# Patient Record
Sex: FEMALE | Race: WHITE | NOT HISPANIC OR LATINO | Employment: UNEMPLOYED | URBAN - METROPOLITAN AREA
[De-identification: names, ages, dates, MRNs, and addresses within clinical notes are randomized per-mention and may not be internally consistent; named-entity substitution may affect disease eponyms.]

---

## 2019-03-06 ENCOUNTER — OFFICE VISIT (OUTPATIENT)
Dept: OBGYN CLINIC | Facility: CLINIC | Age: 34
End: 2019-03-06

## 2019-03-06 VITALS
DIASTOLIC BLOOD PRESSURE: 75 MMHG | BODY MASS INDEX: 26.06 KG/M2 | HEIGHT: 61 IN | SYSTOLIC BLOOD PRESSURE: 119 MMHG | HEART RATE: 69 BPM | WEIGHT: 138 LBS

## 2019-03-06 DIAGNOSIS — Z01.419 WOMEN'S ANNUAL ROUTINE GYNECOLOGICAL EXAMINATION: Primary | ICD-10-CM

## 2019-03-06 PROCEDURE — 99385 PREV VISIT NEW AGE 18-39: CPT | Performed by: NURSE PRACTITIONER

## 2019-03-06 PROCEDURE — G0124 SCREEN C/V THIN LAYER BY MD: HCPCS | Performed by: PATHOLOGY

## 2019-03-06 PROCEDURE — G0145 SCR C/V CYTO,THINLAYER,RESCR: HCPCS | Performed by: PATHOLOGY

## 2019-03-06 PROCEDURE — 87624 HPV HI-RISK TYP POOLED RSLT: CPT | Performed by: NURSE PRACTITIONER

## 2019-03-06 RX ORDER — MULTIVITAMIN
1 CAPSULE ORAL DAILY
COMMUNITY
End: 2021-10-14 | Stop reason: ALTCHOICE

## 2019-03-06 NOTE — PATIENT INSTRUCTIONS
Autoexamen del seno para las mujeres   LO QUE NECESITA SABER:   ¿Qué es el autoexamen de seno? Un autoexamen de seno es [place] de revisar si lorena senos tienen protuberancias u otros cambios  Los autoexámenes de seno regulares pueden ayudarla a conocer cómo se ananya y se sienten lorena senos normalmente  La mayoría de las protuberancias o cambios en el seno  no son cáncer, jolene usted siempre debería ir con urrutia médico para que la revise  Urrutia médico también puede observarla e indicarle si usted está realizando urrutia autoexamen correctamente  ¿Por qué debería realizarme un autoexamen de seno? El cáncer de seno es el tipo de cáncer más común en las mujeres  Aún si a usted le [employer], todavía puede seguir revisándose regularmente  Si usted sabe cómo se sienten y se ananya lorena senos normalmente, eso podría ayudarle a determinar cuándo comunicarse con urrutia médico  Es posible que lita mamografía no detecte algún cáncer  Usted podría encontrar lita protuberancia lucrecia un autoexamen de seno que no se detectó con urrutia mamografía  ¿Cuándo debería realizarme un autoexamen de seno? Asif urrutia calendario para que recuerde hacerse un autoexamen del seno en lita fecha regular  Lita forma fácil de recordar es realizar el autoexamen de seno en el mismo día cada mes  Si usted tiene [place], es posible que lo mejor sea que se franky un autoexamen 1 semana después de que terminó urrutia periodo  [place] es cuando lorena senos podrían estar menos inflamados, abultados o sensibles  Usted puede realizarse autoexámenes del seno regulares incluso si está dando de lactar o si tiene implantes en el seno  ¿Cómo debería realizarme un autoexamen de seno? · Observe lorena senos en un naty  Observe el tamaño y la forma de cada seno y del pezón  Revise si hay inflamación, protuberancias, hoyuelos, piel descamada u otros cambios en la piel  Vigile los cambios en el pezón, ceci cuando tiene dolor o que comienza a hundirse   Apriete cuidadosamente ambos pezones y revise si sale líquido (que no sea [place]) de ellos  Si usted detecta cualquiera de estos u otros cambios en lorena senos, comuníquese con urrutia médico  Revise lorena senos mientras está sentada o olvera en las 3 siguientes posiciones:    ¨ Cuelgue lorena brazos en lorena costados  ¨ Levante lorena josemanuel y [place] detrás de urrutia johanyn  ¨ Ejerza presión firme con lorena josemanuel sobre lorena caderas  Inclínese un poco hacia adelante mientras observa lorena senos en el naty  · Acuéstese y palpe lorena senos  Cuando usted se [place], el tejido de lorena senos se extiende uniformemente sobre urrutia pecho  [place] facilita que usted sienta protuberancias o cualquier cosa que podría no ser normal para lorena senos  Realice un autoexamen con un seno a la vez  ¨ Coloque lita almohada pequeña o lita toalla debajo de urrutia hombro bhavesh  Coloque urrutia brazo bhavesh detrás de urrutia johanny  ¨ Use los 3 dedos medios de urrutia mano derecha  Use las yemas de los dedos, en la parte superior  La yema del dedo es la parte más sensible de urrutia dedo  ¨ Mike círculos pequeños para sentir el tejido del seno  Use las yemas de lorena dedos para hacer círculos pequeños empalmados sobre lorena senos y [place]  Use presión ligera, media y firme  Ezio, presione ligeramente  Después, presione con lita presión media para sentir un poco más profundo en urrutia seno  Por último, use presión firme para sentir urrutia seno en lo más profundo  ¨ Examine el área completa de urrutia seno  Examine el área del seno desde arriba hasta abajo donde usted siente las [place]  Kip Rana pequeños con las yemas de los dedos, comenzando en la parte media de urrutia axila  Kip Rana subiendo y [place] el área del seno  Continúe hacia urrutia seno, hasta llegar al [employer]  Examine toda el área desde la axila hasta el centro de urrutia pecho ([place])  Deténgase en el centro de urrutia pecho      ¨ Mueva la almohada o toalla hacia urrutia hombro derecho y coloque urrutia Theodore Mancia derecho detrás de valerio johanny  Use las yemas de los 3 dedos medios de valerio mano izquierda y repita los pasos anteriores para realizar un autoexamen en valerio seno derecho  ¿Qué más puedo hacer para la revisión de problemas de seno o del cáncer de seno? Algunos expertos sugieren que las mujeres de 36 años de edad y mayores deberían realizarse lita mamografía cada año  Otros sugieren [employer] 49 y 77 años de edad se smitha lita mamografía cada 2 años  Consulte con valerio médico sobre cuándo usted debería [employer]  ¿Cuándo manju comunicarme con mi médico?   · Usted encuentra algún tipo de bulto o cambio en lorena senos  · Usted tiene [facility], o le sale líquido de lorena pezones  · Usted tiene preguntas o inquietudes acerca de valerio condición o cuidado  ACUERDOS SOBRE VALERIO CUIDADO:   Usted tiene el derecho de ayudar a planear valerio cuidado  Aprenda todo lo que pueda sobre valerio condición y ceci darle tratamiento  Discuta lorena opciones de tratamiento con lorena médicos para decidir el cuidado que usted desea recibir  Usted siempre tiene el derecho de rechazar el tratamiento  Esta información es sólo para uso en educación  Valerio intención no es darle un consejo médico sobre enfermedades o tratamientos  Colsulte con valerio Rosemarie Meléndez farmacéutico antes de seguir cualquier régimen médico para saber si es seguro y efectivo para usted  © 2017 [address] Information is for End User's use only and may not be sold, redistributed or otherwise used for commercial purposes  All illustrations and images included in CareNotes® are the copyrighted property of A D A M , Inc  or Davion Milian de Papanicolaou   LO QUE NECESITA SABER:   ¿Qué necesito saber acerca del Papanicolaou? Un frotis o de Papanicolaou o lita citología se [place] en la detección del cáncer cervical  También se [place] para detectar células cancerosas y precancerosas en la vulva y en la vagina     ¿Cómo me preparo para la prueba del Papanicolaou? El mejor momento para programar el examen es arlin después de que termine urrutia menstruación  No programe lita citología lucrecia urrutia periodo menstrual   ¿Qué sucederá lucrecia la prueba del Papanicolaou? · Usted se acostará boca arriba y colocará lorena pies en unos reposapiés que se conocen ceci estribos  Urrutia médico introducirá cuidadosamente en urrutia vagina un dispositivo que se conoce ceci espéculo  El espéculo se [place] para abrir las nicole de urrutia vagina para que el médico pueda observar urrutia modesto uterino  · Urrutia médico raspará cuidadosamente el modesto uterino y [place] vaginal para yaneth muestras de células  Las muestras se colocan en un tubo con líquido o en un portaobjetos de nain  Las muestras se mandan al laboratorio para ser analizadas por la presencia de células anormales  Podrían realizarle un examen del virus del papiloma humano (VPH) al [employer]  El VPH es un virus de transmisión sexual que puede provocar cambios en lorena células cervicales  ¿Qué sucederá después de un Papanicolaou? Urrutia médico le indicará para cuándo puede esperar lorena resultados de la prueba del Papanicolaou  Es posible que presente manchado el día del procedimiento  ¿Con qué frecuencia necesito un Papanicolaou? El examen del Papanicolaou por lo general se realiza entre 3 a 5 años dependiendo de urrutia edad  Usted podría necesitar un Papanicolaou con mayor frecuencia si presenta cualquiera de los siguientes:  · Un examen con resultado positivo para el virus del papiloma humano (VPH)    · Antecedentes de cáncer cervical    · El VIH    · Un sistema inmunitario débil    · Exposición al [place] de dietilestilbestrol (AMPARO) cuando urrutia madre estaba embarazada de usted  ACUERDOS SOBRE URRUTIA CUIDADO:   Usted tiene el derecho de ayudar a planear urrutia cuidado  Aprenda todo lo que pueda sobre urrutia condición y ceci darle tratamiento  Discuta lorena opciones de tratamiento con lorena médicos para decidir el cuidado que usted desea recibir  Usted siempre tiene el derecho de rechazar el tratamiento  Esta información es sólo para uso en educación  Urrutia intención no es darle un consejo médico sobre enfermedades o tratamientos  Colsulte con urrutia Alexandra Filiberto farmacéutico antes de seguir cualquier régimen médico para saber si es seguro y efectivo para usted  © 2017 [address] Information is for End User's use only and may not be sold, redistributed or otherwise used for commercial purposes  All illustrations and images included in CareNotes® are the copyrighted property of A D A M , Inc  or Davion Terrell

## 2019-03-06 NOTE — PROGRESS NOTES
Patient is [place] speaking- rose mary Mccarty at bedside to translate  Shobha Sky is a 29 y o  female who presents for annual well woman exam   Last Pap smear approximately 15 years ago  Will collect Pap smear today  Denies history of abnormal Pap smears  Periods are 30, lasting 3 days  No intermenstrual bleeding, spotting, or discharge  Current contraception: Britany 28 - from [place]   History of abnormal Pap smear: no  Family history of uterine or ovarian cancer: no  Regular self breast exam: no  History of abnormal mammogram:  Mammograms to be a age 37  Family history of breast cancer: no  History of abnormal lipids: no    Menstrual History:  OB History        2    Para   2    Term   0       0    AB   0    Living   2       SAB   0    TAB   0    Ectopic   0    Multiple   0    Live Births   2                Menarche age: 14  No LMP recorded  Period Cycle (Days): 30  Period Duration (Days): 3 days  Period Pattern: Regular  Menstrual Flow: Moderate  Menstrual Control: Maxi pad, Thin pad    The following portions of the patient's history were reviewed and updated as appropriate: allergies, current medications, past family history, past medical history, past social history, past surgical history and problem list     Review of Systems  Pertinent items are noted in HPI        Objective      /66 (BP Location: Left arm, Patient Position: Sitting, Cuff Size: Standard)   Pulse 75   Ht 5' 1" (1 549 m)   Wt 62 6 kg (138 lb)   BMI 26 07 kg/m²     General:   alert and oriented, in no acute distress, alert, cooperative, appears stated age and cooperative   Heart: regular rate and rhythm, S1, S2 normal, no murmur, click, rub or gallop   Lungs: clear to auscultation bilaterally   Abdomen: soft, non-tender, without masses or organomegaly, nondistended and normal bowel sounds   Vulva: normal, Bartholin's, Urethra, [place]'s normal, female escutcheon   Vagina: normal mucosa, normal discharge, no palpable nodules   Cervix: no bleeding following Pap, no cervical motion tenderness and no lesions   Uterus: normal size, non-tender, normal shape and consistency   Adnexa: normal adnexa and no mass, fullness, tenderness   Breast:  Nontender, no palpable masses, no nipple discharge, no skin changes bilaterally  Assessment      @well woman@   Plan      All questions answered  Await pap smear results  Breast self exam technique reviewed and patient encouraged to perform self-exam monthly  Contraception: Birth control pill from [place]  Diagnosis explained in detail, including differential   Dietary diary  Discussed healthy lifestyle modifications  Educational material distributed  Pap smear  Thin prep Pap smear    Breast awareness reviewed    Will call with abnormal results    RTO 1 year or sooner as needed

## 2019-03-07 LAB
HPV HR 12 DNA CVX QL NAA+PROBE: NEGATIVE
HPV16 DNA CVX QL NAA+PROBE: NEGATIVE
HPV18 DNA CVX QL NAA+PROBE: POSITIVE

## 2019-03-12 LAB
LAB AP GYN PRIMARY INTERPRETATION: ABNORMAL
Lab: ABNORMAL
PATH INTERP SPEC-IMP: ABNORMAL

## 2019-03-13 ENCOUNTER — PATIENT OUTREACH (OUTPATIENT)
Dept: OBGYN CLINIC | Facility: CLINIC | Age: 34
End: 2019-03-13

## 2019-03-13 ENCOUNTER — TELEPHONE (OUTPATIENT)
Dept: OBGYN CLINIC | Facility: CLINIC | Age: 34
End: 2019-03-13

## 2019-03-22 ENCOUNTER — PROCEDURE VISIT (OUTPATIENT)
Dept: OBGYN CLINIC | Facility: CLINIC | Age: 34
End: 2019-03-22

## 2019-03-22 VITALS
BODY MASS INDEX: 26.41 KG/M2 | HEIGHT: 61 IN | HEART RATE: 76 BPM | WEIGHT: 139.9 LBS | DIASTOLIC BLOOD PRESSURE: 76 MMHG | SYSTOLIC BLOOD PRESSURE: 118 MMHG

## 2019-03-22 DIAGNOSIS — B97.7 HPV IN FEMALE: ICD-10-CM

## 2019-03-22 DIAGNOSIS — R87.612 PAP SMEAR ABNORMALITY OF CERVIX WITH LGSIL: Primary | ICD-10-CM

## 2019-03-22 PROCEDURE — 57454 BX/CURETT OF CERVIX W/SCOPE: CPT | Performed by: OBSTETRICS & GYNECOLOGY

## 2019-03-22 PROCEDURE — 88305 TISSUE EXAM BY PATHOLOGIST: CPT | Performed by: PATHOLOGY

## 2019-03-22 NOTE — PROGRESS NOTES
Colposcopy  Date/Time: 3/22/2019 10:54 AM  Performed by: Leanne Sandoval MD  Authorized by: Leanne Sandoval MD     Consent:     Consent obtained:  Verbal and written    Consent given by:  Patient    Procedural risks discussed:  Bleeding, failure rate, infection, repeat procedure and possible continued pain    Patient questions answered: yes      Patient agrees, verbalizes understanding, and wants to proceed: yes    Pre-procedure:     Prepped with: acetic acid    Indication:     Indication:  LSIL  Procedure:     Procedure: Colposcopy w/ cervical biopsy and ECC      Under satisfactory analgesia the patient was prepped and draped in the dorsal lithotomy position: yes      [place] speculum was placed in the vagina: yes      Under colposcopic examination the transition zone was seen in entirety: yes      Endocervix was curetted using a Kevorkian curette: yes      Cervical biopsy performed with a cervical biopsy punch: yes      Monsel's solution was applied: yes      Biopsy(s): yes      Location:  4 o'clock    Specimen to pathology: yes    Post-procedure:     Findings: Bleeding      Impression: Low grade cervical dysplasia      Patient tolerance of procedure: Tolerated well, no immediate complications        Colposcopy Procedure Note    Indications: Pap smear 1 months ago showed: LSIL with HPV 18 positive  The prior pap showed patient had no pror paps  Procedure Details   The risks and benefits of the procedure and Written informed consent obtained  Speculum placed in vagina and excellent visualization of cervix achieved, cervix swabbed x 3 with acetic acid solution  Findings:  Cervix: acetowhite lesion(s) noted at 4 o'clock; endocervical curettage performed and cervical biopsies taken at 4 o'clock  Vaginal inspection: normal without visible lesions  Vulvar colposcopy: normal mucosa without lesions  Specimens: 4 o'clock and ECC    Complications: none      Plan:  Specimens labelled and sent to Pathology  Return to discuss Pathology results in 2 weeks

## 2019-04-15 ENCOUNTER — PATIENT OUTREACH (OUTPATIENT)
Dept: OBGYN CLINIC | Facility: CLINIC | Age: 34
End: 2019-04-15

## 2019-04-15 ENCOUNTER — OFFICE VISIT (OUTPATIENT)
Dept: OBGYN CLINIC | Facility: CLINIC | Age: 34
End: 2019-04-15

## 2019-04-15 VITALS
WEIGHT: 141.4 LBS | HEART RATE: 75 BPM | BODY MASS INDEX: 26.72 KG/M2 | DIASTOLIC BLOOD PRESSURE: 73 MMHG | SYSTOLIC BLOOD PRESSURE: 118 MMHG

## 2019-04-15 DIAGNOSIS — R87.612 LOW GRADE SQUAMOUS INTRAEPITHELIAL LESION (LGSIL) ON CERVICAL PAP SMEAR: Primary | ICD-10-CM

## 2019-04-15 PROCEDURE — 99213 OFFICE O/P EST LOW 20 MIN: CPT | Performed by: OBSTETRICS & GYNECOLOGY

## 2021-10-04 DIAGNOSIS — N91.2 AMENORRHEA: Primary | ICD-10-CM

## 2021-10-07 ENCOUNTER — APPOINTMENT (OUTPATIENT)
Dept: LAB | Facility: HOSPITAL | Age: 36
End: 2021-10-07

## 2021-10-07 DIAGNOSIS — N91.2 AMENORRHEA: ICD-10-CM

## 2021-10-07 DIAGNOSIS — Z3A.01 LESS THAN 8 WEEKS GESTATION OF PREGNANCY: Primary | ICD-10-CM

## 2021-10-07 LAB
ABO GROUP BLD: NORMAL
B-HCG SERPL-ACNC: 35 MIU/ML
BLD GP AB SCN SERPL QL: NEGATIVE
RH BLD: POSITIVE
SPECIMEN EXPIRATION DATE: NORMAL

## 2021-10-07 PROCEDURE — 86901 BLOOD TYPING SEROLOGIC RH(D): CPT

## 2021-10-07 PROCEDURE — 86900 BLOOD TYPING SEROLOGIC ABO: CPT

## 2021-10-07 PROCEDURE — 36415 COLL VENOUS BLD VENIPUNCTURE: CPT

## 2021-10-07 PROCEDURE — 84702 CHORIONIC GONADOTROPIN TEST: CPT

## 2021-10-07 PROCEDURE — 86850 RBC ANTIBODY SCREEN: CPT

## 2021-10-08 ENCOUNTER — HOSPITAL ENCOUNTER (EMERGENCY)
Facility: HOSPITAL | Age: 36
Discharge: HOME | End: 2021-10-09
Attending: EMERGENCY MEDICINE

## 2021-10-08 DIAGNOSIS — R10.30 LOWER ABDOMINAL PAIN: ICD-10-CM

## 2021-10-08 DIAGNOSIS — O03.9 MISCARRIAGE: Primary | ICD-10-CM

## 2021-10-08 DIAGNOSIS — N93.9 VAGINAL BLEEDING: ICD-10-CM

## 2021-10-08 LAB
B-HCG SERPL-ACNC: 14 MIU/ML
BACTERIA UR QL AUTO: ABNORMAL /HPF
BILIRUB UR QL STRIP: NEGATIVE
CLARITY UR: CLEAR
COLOR UR: YELLOW
GLUCOSE UR STRIP-MCNC: NEGATIVE MG/DL
HGB UR QL STRIP.AUTO: ABNORMAL
HYALINE CASTS #/AREA URNS LPF: ABNORMAL /LPF
KETONES UR STRIP-MCNC: NEGATIVE MG/DL
LEUKOCYTE ESTERASE UR QL STRIP: NEGATIVE
NITRITE UR QL STRIP: NEGATIVE
NON-SQ EPI CELLS URNS QL MICRO: ABNORMAL /HPF
PH UR STRIP.AUTO: 6.5 [PH] (ref 4.5–8)
PROT UR STRIP-MCNC: NEGATIVE MG/DL
RBC #/AREA URNS AUTO: ABNORMAL /HPF
SP GR UR STRIP.AUTO: 1.02 (ref 1–1.03)
UROBILINOGEN UR QL STRIP.AUTO: 0.2 E.U./DL
WBC #/AREA URNS AUTO: ABNORMAL /HPF

## 2021-10-08 PROCEDURE — 99284 EMERGENCY DEPT VISIT MOD MDM: CPT

## 2021-10-08 PROCEDURE — 87186 SC STD MICRODIL/AGAR DIL: CPT

## 2021-10-08 PROCEDURE — 81001 URINALYSIS AUTO W/SCOPE: CPT

## 2021-10-08 PROCEDURE — 84702 CHORIONIC GONADOTROPIN TEST: CPT | Performed by: EMERGENCY MEDICINE

## 2021-10-08 PROCEDURE — 36415 COLL VENOUS BLD VENIPUNCTURE: CPT | Performed by: EMERGENCY MEDICINE

## 2021-10-08 PROCEDURE — 99284 EMERGENCY DEPT VISIT MOD MDM: CPT | Performed by: EMERGENCY MEDICINE

## 2021-10-08 PROCEDURE — 87077 CULTURE AEROBIC IDENTIFY: CPT

## 2021-10-08 PROCEDURE — 87086 URINE CULTURE/COLONY COUNT: CPT

## 2021-10-08 RX ORDER — ACETAMINOPHEN 325 MG/1
975 TABLET ORAL ONCE
Status: COMPLETED | OUTPATIENT
Start: 2021-10-08 | End: 2021-10-08

## 2021-10-08 RX ORDER — ONDANSETRON 4 MG/1
4 TABLET, ORALLY DISINTEGRATING ORAL ONCE
Status: COMPLETED | OUTPATIENT
Start: 2021-10-08 | End: 2021-10-08

## 2021-10-08 RX ADMIN — ONDANSETRON 4 MG: 4 TABLET, ORALLY DISINTEGRATING ORAL at 22:59

## 2021-10-08 RX ADMIN — ACETAMINOPHEN 975 MG: 325 TABLET ORAL at 22:59

## 2021-10-09 ENCOUNTER — APPOINTMENT (EMERGENCY)
Dept: RADIOLOGY | Facility: HOSPITAL | Age: 36
End: 2021-10-09

## 2021-10-09 VITALS
SYSTOLIC BLOOD PRESSURE: 106 MMHG | DIASTOLIC BLOOD PRESSURE: 60 MMHG | TEMPERATURE: 99.1 F | BODY MASS INDEX: 28.15 KG/M2 | OXYGEN SATURATION: 98 % | WEIGHT: 149 LBS | HEART RATE: 76 BPM | RESPIRATION RATE: 18 BRPM

## 2021-10-09 PROCEDURE — 76815 OB US LIMITED FETUS(S): CPT

## 2021-10-11 ENCOUNTER — APPOINTMENT (OUTPATIENT)
Dept: LAB | Facility: HOSPITAL | Age: 36
End: 2021-10-11
Attending: EMERGENCY MEDICINE

## 2021-10-11 DIAGNOSIS — R10.30 LOWER ABDOMINAL PAIN: ICD-10-CM

## 2021-10-11 DIAGNOSIS — O03.9 MISCARRIAGE: ICD-10-CM

## 2021-10-11 DIAGNOSIS — N93.9 VAGINAL BLEEDING: ICD-10-CM

## 2021-10-11 LAB
B-HCG SERPL-ACNC: 4 MIU/ML
BACTERIA UR CULT: ABNORMAL

## 2021-10-11 PROCEDURE — 36415 COLL VENOUS BLD VENIPUNCTURE: CPT

## 2021-10-11 PROCEDURE — 84702 CHORIONIC GONADOTROPIN TEST: CPT

## 2021-10-14 ENCOUNTER — OFFICE VISIT (OUTPATIENT)
Dept: INTERNAL MEDICINE CLINIC | Facility: CLINIC | Age: 36
End: 2021-10-14

## 2021-10-14 VITALS
HEIGHT: 63 IN | DIASTOLIC BLOOD PRESSURE: 69 MMHG | TEMPERATURE: 97.6 F | HEART RATE: 91 BPM | BODY MASS INDEX: 26.93 KG/M2 | SYSTOLIC BLOOD PRESSURE: 114 MMHG | WEIGHT: 152 LBS

## 2021-10-14 DIAGNOSIS — Z23 NEED FOR TDAP VACCINATION: ICD-10-CM

## 2021-10-14 DIAGNOSIS — Z00.00 ENCOUNTER FOR ANNUAL HEALTH EXAMINATION: Primary | ICD-10-CM

## 2021-10-14 DIAGNOSIS — E66.3 OVERWEIGHT (BMI 25.0-29.9): ICD-10-CM

## 2021-10-14 DIAGNOSIS — R53.83 TIRED: ICD-10-CM

## 2021-10-14 DIAGNOSIS — Z91.89 NEED FOR DENTAL CARE: ICD-10-CM

## 2021-10-14 DIAGNOSIS — Z23 NEED FOR INFLUENZA VACCINATION: ICD-10-CM

## 2021-10-14 DIAGNOSIS — Z13.220 SCREENING FOR LIPID DISORDERS: ICD-10-CM

## 2021-10-14 PROCEDURE — 90472 IMMUNIZATION ADMIN EACH ADD: CPT | Performed by: PHYSICIAN ASSISTANT

## 2021-10-14 PROCEDURE — 99385 PREV VISIT NEW AGE 18-39: CPT | Performed by: PHYSICIAN ASSISTANT

## 2021-10-14 PROCEDURE — 90715 TDAP VACCINE 7 YRS/> IM: CPT | Performed by: PHYSICIAN ASSISTANT

## 2021-10-14 PROCEDURE — 90471 IMMUNIZATION ADMIN: CPT | Performed by: PHYSICIAN ASSISTANT

## 2021-10-14 PROCEDURE — 90686 IIV4 VACC NO PRSV 0.5 ML IM: CPT | Performed by: PHYSICIAN ASSISTANT

## 2021-10-18 ENCOUNTER — ULTRASOUND (OUTPATIENT)
Dept: OBGYN CLINIC | Facility: CLINIC | Age: 36
End: 2021-10-18

## 2021-10-18 ENCOUNTER — APPOINTMENT (OUTPATIENT)
Dept: LAB | Facility: HOSPITAL | Age: 36
End: 2021-10-18

## 2021-10-18 VITALS
HEIGHT: 63 IN | WEIGHT: 152 LBS | HEART RATE: 69 BPM | SYSTOLIC BLOOD PRESSURE: 118 MMHG | BODY MASS INDEX: 26.93 KG/M2 | DIASTOLIC BLOOD PRESSURE: 75 MMHG

## 2021-10-18 DIAGNOSIS — Z31.9 DESIRE FOR PREGNANCY: Primary | ICD-10-CM

## 2021-10-18 LAB
ALBUMIN SERPL BCP-MCNC: 3.4 G/DL (ref 3.5–5)
ALP SERPL-CCNC: 81 U/L (ref 46–116)
ALT SERPL W P-5'-P-CCNC: 17 U/L (ref 12–78)
ANION GAP SERPL CALCULATED.3IONS-SCNC: 2 MMOL/L (ref 4–13)
AST SERPL W P-5'-P-CCNC: 14 U/L (ref 5–45)
BASOPHILS # BLD AUTO: 0.06 THOUSANDS/ΜL (ref 0–0.1)
BASOPHILS NFR BLD AUTO: 1 % (ref 0–1)
BILIRUB SERPL-MCNC: 0.42 MG/DL (ref 0.2–1)
BUN SERPL-MCNC: 7 MG/DL (ref 5–25)
CALCIUM ALBUM COR SERPL-MCNC: 9.7 MG/DL (ref 8.3–10.1)
CALCIUM SERPL-MCNC: 9.2 MG/DL (ref 8.3–10.1)
CHLORIDE SERPL-SCNC: 109 MMOL/L (ref 100–108)
CHOLEST SERPL-MCNC: 206 MG/DL (ref 50–200)
CO2 SERPL-SCNC: 28 MMOL/L (ref 21–32)
CREAT SERPL-MCNC: 0.56 MG/DL (ref 0.6–1.3)
EOSINOPHIL # BLD AUTO: 0.14 THOUSAND/ΜL (ref 0–0.61)
EOSINOPHIL NFR BLD AUTO: 2 % (ref 0–6)
ERYTHROCYTE [DISTWIDTH] IN BLOOD BY AUTOMATED COUNT: 12.5 % (ref 11.6–15.1)
GFR SERPL CREATININE-BSD FRML MDRD: 121 ML/MIN/1.73SQ M
GLUCOSE SERPL-MCNC: 77 MG/DL (ref 65–140)
HCT VFR BLD AUTO: 43.2 % (ref 34.8–46.1)
HDLC SERPL-MCNC: 53 MG/DL
HGB BLD-MCNC: 14 G/DL (ref 11.5–15.4)
IMM GRANULOCYTES # BLD AUTO: 0.02 THOUSAND/UL (ref 0–0.2)
IMM GRANULOCYTES NFR BLD AUTO: 0 % (ref 0–2)
LDLC SERPL CALC-MCNC: 121 MG/DL (ref 0–100)
LYMPHOCYTES # BLD AUTO: 2.25 THOUSANDS/ΜL (ref 0.6–4.47)
LYMPHOCYTES NFR BLD AUTO: 28 % (ref 14–44)
MCH RBC QN AUTO: 28.6 PG (ref 26.8–34.3)
MCHC RBC AUTO-ENTMCNC: 32.4 G/DL (ref 31.4–37.4)
MCV RBC AUTO: 88 FL (ref 82–98)
MONOCYTES # BLD AUTO: 0.43 THOUSAND/ΜL (ref 0.17–1.22)
MONOCYTES NFR BLD AUTO: 5 % (ref 4–12)
NEUTROPHILS # BLD AUTO: 5.17 THOUSANDS/ΜL (ref 1.85–7.62)
NEUTS SEG NFR BLD AUTO: 64 % (ref 43–75)
NRBC BLD AUTO-RTO: 0 /100 WBCS
PLATELET # BLD AUTO: 321 THOUSANDS/UL (ref 149–390)
PMV BLD AUTO: 9.7 FL (ref 8.9–12.7)
POTASSIUM SERPL-SCNC: 3.9 MMOL/L (ref 3.5–5.3)
PROT SERPL-MCNC: 7.1 G/DL (ref 6.4–8.2)
RBC # BLD AUTO: 4.89 MILLION/UL (ref 3.81–5.12)
SODIUM SERPL-SCNC: 139 MMOL/L (ref 136–145)
TRIGL SERPL-MCNC: 158 MG/DL
TSH SERPL DL<=0.05 MIU/L-ACNC: 0.88 UIU/ML (ref 0.36–3.74)
WBC # BLD AUTO: 8.07 THOUSAND/UL (ref 4.31–10.16)

## 2021-10-18 PROCEDURE — 80053 COMPREHEN METABOLIC PANEL: CPT | Performed by: PHYSICIAN ASSISTANT

## 2021-10-18 PROCEDURE — 99213 OFFICE O/P EST LOW 20 MIN: CPT | Performed by: OBSTETRICS & GYNECOLOGY

## 2021-10-18 PROCEDURE — 84443 ASSAY THYROID STIM HORMONE: CPT | Performed by: PHYSICIAN ASSISTANT

## 2021-10-18 PROCEDURE — 85025 COMPLETE CBC W/AUTO DIFF WBC: CPT | Performed by: PHYSICIAN ASSISTANT

## 2021-10-18 PROCEDURE — 80061 LIPID PANEL: CPT | Performed by: PHYSICIAN ASSISTANT

## 2021-10-18 PROCEDURE — 36415 COLL VENOUS BLD VENIPUNCTURE: CPT | Performed by: PHYSICIAN ASSISTANT

## 2021-10-18 RX ORDER — PNV NO.95/FERROUS FUM/FOLIC AC 28MG-0.8MG
1 TABLET ORAL DAILY
Qty: 30 TABLET | Refills: 11 | Status: SHIPPED | OUTPATIENT
Start: 2021-10-18

## 2021-12-06 ENCOUNTER — ULTRASOUND (OUTPATIENT)
Dept: OBGYN CLINIC | Facility: CLINIC | Age: 36
End: 2021-12-06

## 2021-12-06 VITALS
BODY MASS INDEX: 27.11 KG/M2 | HEART RATE: 94 BPM | SYSTOLIC BLOOD PRESSURE: 134 MMHG | DIASTOLIC BLOOD PRESSURE: 75 MMHG | WEIGHT: 153 LBS | HEIGHT: 63 IN

## 2021-12-06 DIAGNOSIS — N97.9 SECONDARY FEMALE INFERTILITY: Primary | ICD-10-CM

## 2021-12-06 DIAGNOSIS — N96 RECURRENT PREGNANCY LOSS: ICD-10-CM

## 2021-12-06 PROCEDURE — 99213 OFFICE O/P EST LOW 20 MIN: CPT | Performed by: OBSTETRICS & GYNECOLOGY

## 2022-06-16 ENCOUNTER — ANNUAL EXAM (OUTPATIENT)
Dept: OBGYN CLINIC | Facility: CLINIC | Age: 37
End: 2022-06-16

## 2022-06-16 VITALS
HEART RATE: 80 BPM | WEIGHT: 154 LBS | DIASTOLIC BLOOD PRESSURE: 72 MMHG | SYSTOLIC BLOOD PRESSURE: 121 MMHG | HEIGHT: 63 IN | BODY MASS INDEX: 27.29 KG/M2

## 2022-06-16 DIAGNOSIS — R87.612 LOW GRADE SQUAMOUS INTRAEPITHELIAL LESION (LGSIL) ON CERVICAL PAP SMEAR: ICD-10-CM

## 2022-06-16 DIAGNOSIS — N97.9 FEMALE INFERTILITY, SECONDARY: Primary | ICD-10-CM

## 2022-06-16 DIAGNOSIS — Z31.9 DESIRE FOR PREGNANCY: ICD-10-CM

## 2022-06-16 PROCEDURE — G0145 SCR C/V CYTO,THINLAYER,RESCR: HCPCS | Performed by: OBSTETRICS & GYNECOLOGY

## 2022-06-16 PROCEDURE — 99213 OFFICE O/P EST LOW 20 MIN: CPT | Performed by: STUDENT IN AN ORGANIZED HEALTH CARE EDUCATION/TRAINING PROGRAM

## 2022-06-16 PROCEDURE — G0476 HPV COMBO ASSAY CA SCREEN: HCPCS | Performed by: OBSTETRICS & GYNECOLOGY

## 2022-06-16 NOTE — PROGRESS NOTES
OB/GYN VISIT  Chino Prater  6/16/2022  12:35 PM    Assessment      Problem List Items Addressed This Visit        Genitourinary    Female infertility, secondary - Primary     45yo [id number] with secondary infertility  Will start with preliminary infertility workup and semen analysis  Suspect possible male factor  Pap smear collected as well  Grossly normal appearing ultrasound in October 2021 following SAB, no indication to repeat at this time  Patient to return to clinic for follow-up of results  Other    Low grade squamous intraepithelial lesion (LGSIL) on cervical Pap smear    Relevant Orders    Liquid-based pap, screening    HPV High Risk (Completed)    Desire for pregnancy    Relevant Orders    Antimullerian hormone (AMH)    SEMEN ANALYSIS, COMPREHENSIVE    TSH, 3rd generation with Free T4 reflex    Follicle stimulating hormone    Luteinizing hormone    Prolactin    HEMOGLOBIN A1C W/ EAG ESTIMATION    Liquid-based pap, screening    HPV High Risk (Completed)            Subjective      Jame Carranza Piedad is a 40 y o  female who presents for evaluation of infertility  Patient and partner have been attempting conception for over 6 months  Patient has history of two prior term C/S in [place] with previous partner  Denies pregnancy or delivery complications  Current partner's age is 32  He has never fathered other children  Patient had first trimester SAB in October 2021  States that have been trying regularly since this occurred without success  They have been having intercourse daily or every other day  She has not used ovulation predictor kits  Reports regular periods  Marital status: partnered for approximately 2 years  Pregnancies with current partner: no     Menstrual and Endocrine History  LMP Patient's last menstrual period was 05/29/2022 (approximate)     Menarche 13   Shortest interval 28   Longest interval 30  days   Duration of flow 5 days   Heavy menses no   Clots no Intermenstrual bleeding no   Postcoital bleeding no   Dysmenorrhea no   Amenorrhea not applicable   Weight change no   Hirsutism no   Balding no   Acne yes, improved from past   Galactorrhea no     Obstetrical History  History:  4 Para   : 2, in [place] - with previous partner  Complications of pregnancy, labor and postpartum: none    Gynecologic History  Last PAP 3/6/19- LSIL, HR-HPV 18+  Negative colpo   Previous abdominal or pelvic surgery yes, c/s x2   Pelvic pain no   Endometriosis no   Hot flashes occassional   AMPARO exposure unknown   Abnormal Pap yes   Cervix Cryo/cone no   Sexually transmitted diseases no   Pelvic inflammatory disease no     Infertility and Endocrine Studies  Basal body temperature no   Endo with biopsy no   Hysterosalpingogram no   Post-coital test no   Laparoscopy no   Hormonal studies no   Semen analysis no   Other studies no   Medications none   Other therapies Not applicable   Insemination not applicable     Sexual History  Frequency       1 times per day  Satisfied        Yes  Dyspareunia        no    Contraception  None    Family History  Thyroid problems  no   Heart condition or high blood pressure  no   Blood clot or stroke  no   Diabetes  no   Cancer  yes, father   Birth defects/inherited diseases  no   Infectious diseases (mumps, TB, rubella)  no   Other medical problems  no     Habits  Cigarettes:     Patient  -  no     Partner - no  Alcohol:     Patient -  occasional     Partner - occasional  Marijuana:    Patient  - no    Partner - no    Review of Systems  Pertinent items are noted in HPI       Marital History:  single  # of years with this partner: 1  # of partners: 2 or 3    Paternity of Pregnancies:  Number with this partner: 0  Number with other partners: 0  Age of youngest child: 5 years    Urologic History:  Infection unknown   STD no   Mumps unknown   Varicocele unknown   Semen analysis no   Undescended testes unknown   Testicular trauma unknown Genital surgery unknown   Ejaculatory problem unknown   Impotence no       Objective         Female Exam  /72   Pulse 80   Ht 5' 3" (1 6 m)   Wt 69 9 kg (154 lb)   LMP 05/29/2022 (Approximate)   BMI 27 28 kg/m²   Wt Readings from Last 1 Encounters:   06/16/22 69 9 kg (154 lb)     BMI: Body mass index is 27 28 kg/m²  /72   Pulse 80   Ht 5' 3" (1 6 m)   Wt 69 9 kg (154 lb)   LMP 05/29/2022 (Approximate)   BMI 27 28 kg/m²     Physical Exam  Vitals reviewed  Exam conducted with a chaperone present  Constitutional:       General: She is not in acute distress  Appearance: She is well-developed  HENT:      Head: Normocephalic and atraumatic  Eyes:      General: No scleral icterus  Conjunctiva/sclera: Conjunctivae normal    Cardiovascular:      Rate and Rhythm: Normal rate and regular rhythm  Heart sounds: No murmur heard  Pulmonary:      Effort: Pulmonary effort is normal  No respiratory distress  Breath sounds: Normal breath sounds  No wheezing, rhonchi or rales  Abdominal:      General: There is no distension  Palpations: Abdomen is soft  There is no mass  Tenderness: There is no abdominal tenderness  There is no guarding or rebound  Genitourinary:     General: Normal vulva  Vagina: No signs of injury  No vaginal discharge, erythema, tenderness, bleeding or lesions  Cervix: No cervical motion tenderness, discharge, friability, erythema or cervical bleeding  Uterus: Normal  Not deviated, not enlarged, not fixed, not tender and no uterine prolapse  Adnexa: Right adnexa normal and left adnexa normal    Musculoskeletal:         General: No swelling or tenderness  Skin:     General: Skin is warm and dry  Neurological:      General: No focal deficit present  Mental Status: She is alert and oriented to person, place, and time     Psychiatric:         Mood and Affect: Mood normal          Behavior: Behavior normal            Male Exam  Partner not present in office           Miller Serra MD  6/16/2022  12:35 PM

## 2022-06-17 LAB
HPV HR 12 DNA CVX QL NAA+PROBE: NEGATIVE
HPV16 DNA CVX QL NAA+PROBE: NEGATIVE
HPV18 DNA CVX QL NAA+PROBE: NEGATIVE

## 2022-06-21 PROBLEM — N97.9 FEMALE INFERTILITY, SECONDARY: Status: ACTIVE | Noted: 2022-06-21

## 2022-06-21 NOTE — ASSESSMENT & PLAN NOTE
[id number] with secondary infertility  Will start with preliminary infertility workup and semen analysis  Suspect possible male factor  Pap smear collected as well  Grossly normal appearing ultrasound in October 2021 following SAB, no indication to repeat at this time  Patient to return to clinic for follow-up of results

## 2022-06-23 LAB
LAB AP GYN PRIMARY INTERPRETATION: NORMAL
Lab: NORMAL

## 2022-06-30 ENCOUNTER — APPOINTMENT (EMERGENCY)
Dept: ULTRASOUND IMAGING | Facility: HOSPITAL | Age: 37
End: 2022-06-30

## 2022-06-30 ENCOUNTER — APPOINTMENT (OUTPATIENT)
Dept: LAB | Facility: HOSPITAL | Age: 37
End: 2022-06-30

## 2022-06-30 ENCOUNTER — TELEPHONE (OUTPATIENT)
Dept: OBGYN CLINIC | Facility: CLINIC | Age: 37
End: 2022-06-30

## 2022-06-30 ENCOUNTER — HOSPITAL ENCOUNTER (EMERGENCY)
Facility: HOSPITAL | Age: 37
Discharge: HOME | End: 2022-06-30
Attending: EMERGENCY MEDICINE | Admitting: EMERGENCY MEDICINE

## 2022-06-30 VITALS
TEMPERATURE: 98.6 F | DIASTOLIC BLOOD PRESSURE: 74 MMHG | OXYGEN SATURATION: 96 % | RESPIRATION RATE: 18 BRPM | HEART RATE: 78 BPM | SYSTOLIC BLOOD PRESSURE: 123 MMHG

## 2022-06-30 DIAGNOSIS — Z31.9 DESIRE FOR PREGNANCY: ICD-10-CM

## 2022-06-30 DIAGNOSIS — O20.0 THREATENED MISCARRIAGE: Primary | ICD-10-CM

## 2022-06-30 DIAGNOSIS — N97.9 SECONDARY FEMALE INFERTILITY: ICD-10-CM

## 2022-06-30 DIAGNOSIS — N96 RECURRENT PREGNANCY LOSS: ICD-10-CM

## 2022-06-30 LAB
B-HCG SERPL-ACNC: 273 MIU/ML (ref 0–11.6)
BASOPHILS # BLD AUTO: 0.04 THOUSANDS/ΜL (ref 0–0.1)
BASOPHILS NFR BLD AUTO: 0 % (ref 0–1)
EOSINOPHIL # BLD AUTO: 0.02 THOUSAND/ΜL (ref 0–0.61)
EOSINOPHIL NFR BLD AUTO: 0 % (ref 0–6)
ERYTHROCYTE [DISTWIDTH] IN BLOOD BY AUTOMATED COUNT: 12.9 % (ref 11.6–15.1)
EST. AVERAGE GLUCOSE BLD GHB EST-MCNC: 100 MG/DL
FSH SERPL-ACNC: <0.2 MIU/ML
HBA1C MFR BLD: 5.1 %
HCT VFR BLD AUTO: 38.3 % (ref 34.8–46.1)
HGB BLD-MCNC: 12.6 G/DL (ref 11.5–15.4)
IMM GRANULOCYTES # BLD AUTO: 0.05 THOUSAND/UL (ref 0–0.2)
IMM GRANULOCYTES NFR BLD AUTO: 1 % (ref 0–2)
LH SERPL-ACNC: <0.2 MIU/ML
LYMPHOCYTES # BLD AUTO: 2.16 THOUSANDS/ΜL (ref 0.6–4.47)
LYMPHOCYTES NFR BLD AUTO: 20 % (ref 14–44)
MCH RBC QN AUTO: 28.7 PG (ref 26.8–34.3)
MCHC RBC AUTO-ENTMCNC: 32.9 G/DL (ref 31.4–37.4)
MCV RBC AUTO: 87 FL (ref 82–98)
MONOCYTES # BLD AUTO: 0.54 THOUSAND/ΜL (ref 0.17–1.22)
MONOCYTES NFR BLD AUTO: 5 % (ref 4–12)
NEUTROPHILS # BLD AUTO: 8.01 THOUSANDS/ΜL (ref 1.85–7.62)
NEUTS SEG NFR BLD AUTO: 74 % (ref 43–75)
NRBC BLD AUTO-RTO: 0 /100 WBCS
PLATELET # BLD AUTO: 280 THOUSANDS/UL (ref 149–390)
PMV BLD AUTO: 9.4 FL (ref 8.9–12.7)
PROLACTIN SERPL-MCNC: 21.7 NG/ML
RBC # BLD AUTO: 4.39 MILLION/UL (ref 3.81–5.12)
TSH SERPL DL<=0.05 MIU/L-ACNC: 1.92 UIU/ML (ref 0.45–4.5)
WBC # BLD AUTO: 10.82 THOUSAND/UL (ref 4.31–10.16)

## 2022-06-30 PROCEDURE — 84443 ASSAY THYROID STIM HORMONE: CPT

## 2022-06-30 PROCEDURE — 85613 RUSSELL VIPER VENOM DILUTED: CPT

## 2022-06-30 PROCEDURE — 85025 COMPLETE CBC W/AUTO DIFF WBC: CPT | Performed by: EMERGENCY MEDICINE

## 2022-06-30 PROCEDURE — 76815 OB US LIMITED FETUS(S): CPT

## 2022-06-30 PROCEDURE — 86147 CARDIOLIPIN ANTIBODY EA IG: CPT

## 2022-06-30 PROCEDURE — 85670 THROMBIN TIME PLASMA: CPT

## 2022-06-30 PROCEDURE — 86146 BETA-2 GLYCOPROTEIN ANTIBODY: CPT

## 2022-06-30 PROCEDURE — 99284 EMERGENCY DEPT VISIT MOD MDM: CPT

## 2022-06-30 PROCEDURE — 83036 HEMOGLOBIN GLYCOSYLATED A1C: CPT

## 2022-06-30 PROCEDURE — 85732 THROMBOPLASTIN TIME PARTIAL: CPT

## 2022-06-30 PROCEDURE — 36415 COLL VENOUS BLD VENIPUNCTURE: CPT

## 2022-06-30 PROCEDURE — 84146 ASSAY OF PROLACTIN: CPT

## 2022-06-30 PROCEDURE — 85705 THROMBOPLASTIN INHIBITION: CPT

## 2022-06-30 PROCEDURE — 84702 CHORIONIC GONADOTROPIN TEST: CPT | Performed by: EMERGENCY MEDICINE

## 2022-06-30 PROCEDURE — 99282 EMERGENCY DEPT VISIT SF MDM: CPT | Performed by: EMERGENCY MEDICINE

## 2022-06-30 PROCEDURE — 83002 ASSAY OF GONADOTROPIN (LH): CPT

## 2022-06-30 PROCEDURE — 83001 ASSAY OF GONADOTROPIN (FSH): CPT

## 2022-06-30 PROCEDURE — 82397 CHEMILUMINESCENT ASSAY: CPT

## 2022-06-30 NOTE — ED PROVIDER NOTES
History  Chief Complaint   Patient presents with    Abdominal Pain Pregnant     Pt is 5 weeks pregnant and is having abdominal pain some bleeding that started yesterday, hx of miscarriage in October and has 2 children at home     Patient is a 43-year-old female  She is approximately 5 weeks pregnant  She has a history of a recent miscarriage  She is afraid she is miscarrying again  She presents to the emergency room with lower abdominal discomfort and vaginal bleeding since yesterday  It is spotting  No dizziness or near syncope  Symptoms are moderate in severity without aggravating or relieving factors  Prior to Admission Medications   Prescriptions Last Dose Informant Patient Reported? Taking? Prenatal Vit-Fe Fumarate-FA (prenatal vitamin) 28-0 8 mg   No No   Sig: Take 1 tablet by mouth daily      Facility-Administered Medications: None       Past Medical History:   Diagnosis Date    Abnormal Pap smear of cervix     HPV (human papilloma virus) infection     Miscarriage     Patient denies medical problems        Past Surgical History:   Procedure Laterality Date     SECTION      x2    COLPOSCOPY         Family History   Problem Relation Age of Onset    Hypertension Mother     Cancer Father         throat    No Known Problems Sister     No Known Problems Brother     No Known Problems Son     No Known Problems Son      I have reviewed and agree with the history as documented  E-Cigarette/Vaping    E-Cigarette Use Never User      E-Cigarette/Vaping Substances    Nicotine No     THC No     CBD No     Flavoring No     Other No     Unknown No      Social History     Tobacco Use    Smoking status: Never Smoker    Smokeless tobacco: Never Used   Vaping Use    Vaping Use: Never used   Substance Use Topics    Alcohol use:  Yes     Alcohol/week: 1 0 standard drink     Types: 1 Cans of beer per week    Drug use: Never       Review of Systems   Constitutional: Negative for chills and fever  HENT: Negative for rhinorrhea and sore throat  Eyes: Negative for pain, redness and visual disturbance  Respiratory: Negative for cough and shortness of breath  Cardiovascular: Negative for chest pain and leg swelling  Gastrointestinal: Positive for abdominal pain  Negative for diarrhea and vomiting  Endocrine: Negative for polydipsia and polyuria  Genitourinary: Positive for vaginal bleeding  Negative for dysuria, frequency, hematuria and vaginal discharge  Musculoskeletal: Negative for back pain and neck pain  Skin: Negative for rash and wound  Allergic/Immunologic: Negative for immunocompromised state  Neurological: Negative for weakness, numbness and headaches  Hematological: Does not bruise/bleed easily  Psychiatric/Behavioral: Negative for hallucinations and suicidal ideas  All other systems reviewed and are negative  Physical Exam  Physical Exam  Vitals reviewed  Constitutional:       General: She is not in acute distress  HENT:      Head: Normocephalic and atraumatic  Nose: Nose normal       Mouth/Throat:      Mouth: Mucous membranes are moist    Eyes:      General:         Right eye: No discharge  Left eye: No discharge  Conjunctiva/sclera: Conjunctivae normal    Cardiovascular:      Rate and Rhythm: Normal rate and regular rhythm  Pulses: Normal pulses  Heart sounds: Normal heart sounds  No murmur heard  No friction rub  No gallop  Pulmonary:      Effort: Pulmonary effort is normal  No respiratory distress  Breath sounds: Normal breath sounds  No stridor  No wheezing, rhonchi or rales  Abdominal:      General: Bowel sounds are normal  There is no distension  Palpations: Abdomen is soft  Tenderness: There is no abdominal tenderness  There is no right CVA tenderness, left CVA tenderness, guarding or rebound  Musculoskeletal:         General: No swelling, tenderness, deformity or signs of injury   Normal range of motion  Cervical back: Normal range of motion and neck supple  No rigidity  Right lower leg: No edema  Left lower leg: No edema  Comments: No calf tenderness or unilateral leg swelling  Skin:     General: Skin is warm and dry  Coloration: Skin is not jaundiced  Findings: No rash  Neurological:      General: No focal deficit present  Mental Status: She is alert and oriented to person, place, and time  Sensory: No sensory deficit  Motor: Motor function is intact     Psychiatric:         Mood and Affect: Mood normal          Behavior: Behavior normal          Vital Signs  ED Triage Vitals [06/30/22 1322]   Temperature Pulse Respirations Blood Pressure SpO2   98 6 °F (37 °C) 82 18 117/69 95 %      Temp Source Heart Rate Source Patient Position - Orthostatic VS BP Location FiO2 (%)   Oral -- Sitting Right arm --      Pain Score       --           Vitals:    06/30/22 1322   BP: 117/69   Pulse: 82   Patient Position - Orthostatic VS: Sitting         Visual Acuity      ED Medications  Medications - No data to display    Diagnostic Studies  Results Reviewed     Procedure Component Value Units Date/Time    Quantitative hCG [[id number]]  (Abnormal) Collected: 06/30/22 1503    Lab Status: Final result Specimen: Blood from Arm, Right Updated: 06/30/22 1555     HCG, Quant 273 mIU/mL     Narrative:       Expected Ranges:     Approximate               Approximate HCG  Gestation age          Concentration ( mIU/mL)  _____________          ______________________   Christobal Sis                      HCG values  0 2-1                       5-50  1-2                           2-3                         100-5000  3-4                         500-51200  4-5                         1000-94810  5-6                         [id number]  6-8                         51704-946033  8-12                        26888-739121      CBC and differential [[id number]]  (Abnormal) Collected: 06/30/22 1503    Lab Status: Final result Specimen: Blood from Arm, Right Updated: 22 1516     WBC 10 82 Thousand/uL      RBC 4 39 Million/uL      Hemoglobin 12 6 g/dL      Hematocrit 38 3 %      MCV 87 fL      MCH 28 7 pg      MCHC 32 9 g/dL      RDW 12 9 %      MPV 9 4 fL      Platelets 253 Thousands/uL      nRBC 0 /100 WBCs      Neutrophils Relative 74 %      Immat GRANS % 1 %      Lymphocytes Relative 20 %      Monocytes Relative 5 %      Eosinophils Relative 0 %      Basophils Relative 0 %      Neutrophils Absolute 8 01 Thousands/µL      Immature Grans Absolute 0 05 Thousand/uL      Lymphocytes Absolute 2 16 Thousands/µL      Monocytes Absolute 0 54 Thousand/µL      Eosinophils Absolute 0 02 Thousand/µL      Basophils Absolute 0 04 Thousands/µL                  US OB pregnancy limited with transvaginal   Final Result by Cyndi Horan DO ( 1700)       No intrauterine gestational sac identified in this patient with a positive pregnancy test       There is a rounded echogenic structure within the right adnexa  The appearance and beta hCG level favors that this is a bowel loop or other adnexal structure rather than an ectopic pregnancy  However, given that there is no intrauterine gestational    sac, findings are consistent with pregnancy of unknown location (early pregnancy, ectopic pregnancy, or complete )  Short-term follow-up ultrasound with clinical follow-up and beta hCG trending is recommended for further evaluation  The study was marked in [facility] for immediate notification  Workstation performed: [id number]                    Procedures  Procedures         ED Course                                             MDM  Number of Diagnoses or Management Options  Diagnosis management comments: Ultrasound did not show an intrauterine pregnancy  There were no adnexal masses  No free fluid  Hemoglobin was normal   Beta-hCG was in the 270 range    Differential still includes threatened miscarriage, early pregnancy, and ectopic pregnancy  Patient is stable  At this point repeat beta-hCG will need to be done in 2 days and follow-up with OBGYN next week  Strict return if increased abdominal pain, dizziness/syncope, or increased vaginal bleeding  Amount and/or Complexity of Data Reviewed  Clinical lab tests: ordered and reviewed  Tests in the radiology section of CPT®: ordered and reviewed        Disposition  Final diagnoses:   Threatened miscarriage     Time reflects when diagnosis was documented in both MDM as applicable and the Disposition within this note     Time User Action Codes Description Comment    6/30/2022  6:02 PM [place] Add [O20 0] Threatened miscarriage       ED Disposition     ED Disposition   Discharge    Condition   Stable    Date/Time   Thu Jun 30, 2022  6:02 PM    Comment   Jame [address] discharge to home/self care  Follow-up Information     Follow up With Specialties Details Why Contact Info Additional Information    [facility] In 3 days  [address]  [address] [address]  [address], [address], [address], [place], [address] [phone]          Patient's Medications   Discharge Prescriptions    No medications on file       Outpatient Discharge Orders   hCG, quantitative   Standing Status: Future Standing Exp   Date: 07/02/23       PDMP Review     None          ED Provider  Electronically Signed by           Gi Caballero MD  06/30/22 1477

## 2022-06-30 NOTE — TELEPHONE ENCOUNTER
Pt called stating she has "stomach pain" and when using bathroom this morning when wiping saw dark blood on paper  Pt LMP is 5/28/22 and viability screening is on 7/27/22  Pt was advised to go to ER, but pt c/o no insurance and high cost  Pt was informed with pain and bleeding during pregnancy is recommend to get checked in ER  Also pt can apply to [facility] care  Pt understood and said will go to get checked

## 2022-07-02 ENCOUNTER — LAB (OUTPATIENT)
Dept: LAB | Facility: HOSPITAL | Age: 37
End: 2022-07-02
Attending: EMERGENCY MEDICINE

## 2022-07-02 DIAGNOSIS — O20.0 THREATENED MISCARRIAGE: ICD-10-CM

## 2022-07-02 LAB — B-HCG SERPL-ACNC: 258 MIU/ML

## 2022-07-02 PROCEDURE — 36415 COLL VENOUS BLD VENIPUNCTURE: CPT

## 2022-07-02 PROCEDURE — 84702 CHORIONIC GONADOTROPIN TEST: CPT

## 2022-07-02 NOTE — RESULT ENCOUNTER NOTE
Patient called at [phone]  Results reviewed using  #[id number]  Has apt with OB-GYN 7/5 for follow-up  Return to ER precautions given  [Dry] : dry [Erythematous] : erythematous [Patches] : patches [NL] : no acute distress, alert [Moves All Extremities x 4] : moves all extremities x4 [FreeTextEntry1] : Awake, Alert, No Acute Distress [FreeTextEntry7] : Normal respiratory effort [FreeTextEntry9] : Abdomen does not appear distended [de-identified] : erythematous patches with yellowish scales on forehead, face, and back

## 2022-07-03 LAB
APTT SCREEN TO CONFIRM RATIO: 1.04 RATIO (ref 0–1.34)
CONFIRM APTT/NORMAL: 36.8 SEC (ref 0–47.6)
LA PPP-IMP: NORMAL
SCREEN APTT: 37.5 SEC (ref 0–51.9)
SCREEN DRVVT: 40.2 SEC (ref 0–47)
THROMBIN TIME: 15.3 SEC (ref 0–23)

## 2022-07-05 ENCOUNTER — OFFICE VISIT (OUTPATIENT)
Dept: OBGYN CLINIC | Facility: CLINIC | Age: 37
End: 2022-07-05

## 2022-07-05 VITALS
HEIGHT: 63 IN | WEIGHT: 154 LBS | HEART RATE: 87 BPM | DIASTOLIC BLOOD PRESSURE: 72 MMHG | SYSTOLIC BLOOD PRESSURE: 122 MMHG | BODY MASS INDEX: 27.29 KG/M2

## 2022-07-05 DIAGNOSIS — O03.9 SPONTANEOUS ABORTION IN FIRST TRIMESTER: ICD-10-CM

## 2022-07-05 DIAGNOSIS — N91.2 AMENORRHEA: Primary | ICD-10-CM

## 2022-07-05 LAB — MIS SERPL-MCNC: 3.03 NG/ML

## 2022-07-05 PROCEDURE — 99214 OFFICE O/P EST MOD 30 MIN: CPT | Performed by: OBSTETRICS & GYNECOLOGY

## 2022-07-05 NOTE — RESULT ENCOUNTER NOTE
Spoke with pt, had previously not seen results on MyChart   Confirmed normal pap and HPV screen pt stated understanding and had no further questions

## 2022-07-05 NOTE — PATIENT INSTRUCTIONS
Vitamin D 2000 units daily  Prenatal vitamin  CoQ10 300 mg twice daily (OR if CoQ10 Ubiquitinated 125 mg daily)

## 2022-07-07 LAB
B2 GLYCOPROT1 IGA SERPL IA-ACNC: 2.6
B2 GLYCOPROT1 IGG SERPL IA-ACNC: 1.1
B2 GLYCOPROT1 IGM SERPL IA-ACNC: <2.9
CARDIOLIPIN IGA SER IA-ACNC: 2.2
CARDIOLIPIN IGG SER IA-ACNC: 0.9
CARDIOLIPIN IGM SER IA-ACNC: 5.3

## 2022-07-08 PROBLEM — O03.9 SPONTANEOUS ABORTION IN FIRST TRIMESTER: Status: ACTIVE | Noted: 2022-07-08

## 2022-07-08 NOTE — PROGRESS NOTES
OB/GYN VISIT  Raheel Prater  22  11:30 AM    Bloominous Urdu  used for translation    Assessment/Plan:    Lisette Deng is a 40 y o  [id number] female with early pregnancy loss    Problem List Items Addressed This Visit        Other    Spontaneous  in first trimester     Spent extensive time (45 minutes) counseling patient on findings consistent with early pregnancy loss  Her b-hCG is downtrending without evidence of IUP or ectopic pregnancy  Discussed with patient that although her pregnancy test is technically positive, there is no current intrauterine pregnancy  Reviewed that b-hCG should have at least doubled between her ED level and her outpatient repeat  AT this time would recommend repeat one week b-hCG to confirm SAB vs pregnancy of unknown location  Patient has numerous concerns regarding infertility  Discussed need for pregnancy level to normalize before repeating infertility labs  Reviewed strict pain and bleeding precautions  Patient stated understanding  All questions answered to her satisfaction  Other Visit Diagnoses     Amenorrhea    -  Primary    Relevant Orders    hCG, quantitative            Subjective:     Lisette Deng is a 40 y o  [id number] female who presents for ED follow up on 22 for vaginal bleeding and abdominal pain in the setting of early pregnancy  Patient had b-hCG of 273 without evidence of intrauterine pregnancy or ectopic pregnancy  Repeat 48h b-hCG was 258  Patient reports she has had minimal vaginal bleeding and no pain since presentation to the ED  She highly desires pregnancy and has was undergoing early infertility workup when she found out she was pregnant  Objective:    Vitals: Blood pressure 122/72, pulse 87, height 5' 3" (1 6 m), weight 69 9 kg (154 lb), unknown if currently breastfeeding  Body mass index is 27 28 kg/m²      Past Medical History:   Diagnosis Date    Abnormal Pap smear of cervix     HPV (human papilloma virus) infection     Miscarriage     Patient denies medical problems      Past Surgical History:   Procedure Laterality Date     SECTION      x2    COLPOSCOPY         Physical Exam  Vitals reviewed  Constitutional:       Appearance: Normal appearance  She is well-developed  Eyes:      General: No scleral icterus  Conjunctiva/sclera: Conjunctivae normal    Cardiovascular:      Rate and Rhythm: Normal rate  Pulmonary:      Effort: Pulmonary effort is normal  No respiratory distress  Skin:     General: Skin is warm and dry  Neurological:      General: No focal deficit present  Mental Status: She is alert and oriented to person, place, and time  Psychiatric:         Mood and Affect: Mood normal          Behavior: Behavior normal            PELVIC ULTRASOUND, COMPLETE - 22    FINDINGS:     UTERUS:  The uterus is anteverted in position, measuring 9 3 x 4 3 x 5 6 cm  The uterus has a normal contour and echotexture  The cervix appears within normal limits      ENDOMETRIUM:    The endometrial echo complex has an AP caliber of 11 mm  Its appearance is within normal limits for age and cycle and shows no filling defects  No intrauterine gestational sac identified      OVARIES/ADNEXA:  Right ovary:  3 9 x 2 6 x 2 7 cm  14 1 mL  There is a follicular cyst or corpus luteum within the right ovary  Within the right adnexa, there is a rounded hyperechoic structure with mild peripheral hypervascularity measuring 1 4 x 1 4 x 1 8 cm  Doppler flow within normal limits      Left ovary:  2 3 x 1 6 x 1 6 cm  3 2 mL  No suspicious left ovarian abnormality  Doppler flow within normal limits      No suspicious adnexal mass or loculated collections  There is no free fluid         IMPRESSION:     No intrauterine gestational sac identified in this patient with a positive pregnancy test      There is a rounded echogenic structure within the right adnexa    The appearance and beta hCG level favors that this is a bowel loop or other adnexal structure rather than an ectopic pregnancy  However, given that there is no intrauterine gestational sac, findings are consistent with pregnancy of unknown location (early pregnancy, ectopic pregnancy, or complete )  Short-term follow-up ultrasound with clinical follow-up and beta hCG trending is recommended for further evaluation      Paulette Ragsdale MD  22  11:30 AM

## 2022-07-08 NOTE — ASSESSMENT & PLAN NOTE
Spent extensive time (45 minutes) counseling patient on findings consistent with early pregnancy loss  Her b-hCG is downtrending without evidence of IUP or ectopic pregnancy  Discussed with patient that although her pregnancy test is technically positive, there is no current intrauterine pregnancy  Reviewed that b-hCG should have at least doubled between her ED level and her outpatient repeat  AT this time would recommend repeat one week b-hCG to confirm SAB vs pregnancy of unknown location  Patient has numerous concerns regarding infertility  Discussed need for pregnancy level to normalize before repeating infertility labs  Reviewed strict pain and bleeding precautions  Patient stated understanding  All questions answered to her satisfaction

## 2022-07-09 ENCOUNTER — APPOINTMENT (OUTPATIENT)
Dept: LAB | Facility: HOSPITAL | Age: 37
End: 2022-07-09

## 2022-07-09 DIAGNOSIS — O36.80X0 PREGNANCY OF UNKNOWN ANATOMIC LOCATION: Primary | ICD-10-CM

## 2022-07-09 DIAGNOSIS — N91.2 AMENORRHEA: ICD-10-CM

## 2022-07-09 LAB — B-HCG SERPL-ACNC: 401 MIU/ML

## 2022-07-09 PROCEDURE — 36415 COLL VENOUS BLD VENIPUNCTURE: CPT

## 2022-07-09 PROCEDURE — 84702 CHORIONIC GONADOTROPIN TEST: CPT

## 2022-07-12 ENCOUNTER — PATIENT OUTREACH (OUTPATIENT)
Dept: OBGYN CLINIC | Facility: CLINIC | Age: 37
End: 2022-07-12

## 2022-07-12 DIAGNOSIS — O36.80X0 PREGNANCY OF UNKNOWN ANATOMIC LOCATION: Primary | ICD-10-CM

## 2022-07-12 NOTE — PROGRESS NOTES
HALIE BARBOSA called Pt with Dr Adamaris Taylor to explained in [address] the need of [address]  Pt also to get blood work today or tomorrow  HALIE BARBOSA assisted Pt to make appointment and she will get US tomorrow 7/13 at 1:30 pm at Radiology, [place]  Pt verbalized understanding

## 2022-07-13 ENCOUNTER — APPOINTMENT (OUTPATIENT)
Dept: LAB | Facility: HOSPITAL | Age: 37
End: 2022-07-13

## 2022-07-13 ENCOUNTER — HOSPITAL ENCOUNTER (EMERGENCY)
Facility: HOSPITAL | Age: 37
Discharge: HOME | End: 2022-07-13
Attending: EMERGENCY MEDICINE | Admitting: EMERGENCY MEDICINE

## 2022-07-13 ENCOUNTER — PATIENT OUTREACH (OUTPATIENT)
Dept: OBGYN CLINIC | Facility: CLINIC | Age: 37
End: 2022-07-13

## 2022-07-13 ENCOUNTER — HOSPITAL ENCOUNTER (OUTPATIENT)
Dept: RADIOLOGY | Facility: HOSPITAL | Age: 37
Discharge: HOME | End: 2022-07-13

## 2022-07-13 VITALS
TEMPERATURE: 98.8 F | WEIGHT: 156.4 LBS | SYSTOLIC BLOOD PRESSURE: 118 MMHG | HEART RATE: 84 BPM | BODY MASS INDEX: 27.71 KG/M2 | OXYGEN SATURATION: 99 % | RESPIRATION RATE: 18 BRPM | DIASTOLIC BLOOD PRESSURE: 72 MMHG

## 2022-07-13 DIAGNOSIS — O36.80X0 PREGNANCY OF UNKNOWN ANATOMIC LOCATION: ICD-10-CM

## 2022-07-13 DIAGNOSIS — O00.90 ECTOPIC PREGNANCY: Primary | ICD-10-CM

## 2022-07-13 DIAGNOSIS — N91.2 AMENORRHEA: ICD-10-CM

## 2022-07-13 PROBLEM — O00.101 RIGHT TUBAL PREGNANCY WITHOUT INTRAUTERINE PREGNANCY: Status: ACTIVE | Noted: 2022-07-13

## 2022-07-13 LAB
ABO GROUP BLD: NORMAL
ALBUMIN SERPL BCP-MCNC: 3.9 G/DL (ref 3.5–5)
ALP SERPL-CCNC: 55 U/L (ref 34–104)
ALT SERPL W P-5'-P-CCNC: 7 U/L (ref 7–52)
ANION GAP SERPL CALCULATED.3IONS-SCNC: 6 MMOL/L (ref 4–13)
AST SERPL W P-5'-P-CCNC: 11 U/L (ref 13–39)
B-HCG SERPL-ACNC: 437 MIU/ML
BILIRUB SERPL-MCNC: 0.35 MG/DL (ref 0.2–1)
BLD GP AB SCN SERPL QL: NEGATIVE
BUN SERPL-MCNC: 9 MG/DL (ref 5–25)
CALCIUM SERPL-MCNC: 8.7 MG/DL (ref 8.4–10.2)
CHLORIDE SERPL-SCNC: 108 MMOL/L (ref 96–108)
CO2 SERPL-SCNC: 24 MMOL/L (ref 21–32)
CREAT SERPL-MCNC: 0.53 MG/DL (ref 0.6–1.3)
ERYTHROCYTE [DISTWIDTH] IN BLOOD BY AUTOMATED COUNT: 13.2 % (ref 11.6–15.1)
GFR SERPL CREATININE-BSD FRML MDRD: 122 ML/MIN/1.73SQ M
GLUCOSE SERPL-MCNC: 83 MG/DL (ref 65–140)
HCT VFR BLD AUTO: 38.4 % (ref 34.8–46.1)
HGB BLD-MCNC: 12.9 G/DL (ref 11.5–15.4)
MCH RBC QN AUTO: 29.6 PG (ref 26.8–34.3)
MCHC RBC AUTO-ENTMCNC: 33.6 G/DL (ref 31.4–37.4)
MCV RBC AUTO: 88 FL (ref 82–98)
PLATELET # BLD AUTO: 274 THOUSANDS/UL (ref 149–390)
PMV BLD AUTO: 9.5 FL (ref 8.9–12.7)
POTASSIUM SERPL-SCNC: 3.5 MMOL/L (ref 3.5–5.3)
PROT SERPL-MCNC: 6.6 G/DL (ref 6.4–8.4)
RBC # BLD AUTO: 4.36 MILLION/UL (ref 3.81–5.12)
RH BLD: POSITIVE
SODIUM SERPL-SCNC: 138 MMOL/L (ref 135–147)
SPECIMEN EXPIRATION DATE: NORMAL
WBC # BLD AUTO: 11.79 THOUSAND/UL (ref 4.31–10.16)

## 2022-07-13 PROCEDURE — NC001 PR NO CHARGE: Performed by: OBSTETRICS & GYNECOLOGY

## 2022-07-13 PROCEDURE — 86901 BLOOD TYPING SEROLOGIC RH(D): CPT | Performed by: OBSTETRICS & GYNECOLOGY

## 2022-07-13 PROCEDURE — 86850 RBC ANTIBODY SCREEN: CPT | Performed by: OBSTETRICS & GYNECOLOGY

## 2022-07-13 PROCEDURE — 96372 THER/PROPH/DIAG INJ SC/IM: CPT

## 2022-07-13 PROCEDURE — 86900 BLOOD TYPING SEROLOGIC ABO: CPT | Performed by: OBSTETRICS & GYNECOLOGY

## 2022-07-13 PROCEDURE — 96360 HYDRATION IV INFUSION INIT: CPT

## 2022-07-13 PROCEDURE — 76815 OB US LIMITED FETUS(S): CPT

## 2022-07-13 PROCEDURE — 99284 EMERGENCY DEPT VISIT MOD MDM: CPT | Performed by: EMERGENCY MEDICINE

## 2022-07-13 PROCEDURE — 84702 CHORIONIC GONADOTROPIN TEST: CPT

## 2022-07-13 PROCEDURE — 96361 HYDRATE IV INFUSION ADD-ON: CPT

## 2022-07-13 PROCEDURE — 85027 COMPLETE CBC AUTOMATED: CPT | Performed by: OBSTETRICS & GYNECOLOGY

## 2022-07-13 PROCEDURE — 36415 COLL VENOUS BLD VENIPUNCTURE: CPT

## 2022-07-13 PROCEDURE — 99284 EMERGENCY DEPT VISIT MOD MDM: CPT

## 2022-07-13 PROCEDURE — 80053 COMPREHEN METABOLIC PANEL: CPT | Performed by: OBSTETRICS & GYNECOLOGY

## 2022-07-13 RX ORDER — SODIUM CHLORIDE, SODIUM LACTATE, POTASSIUM CHLORIDE, CALCIUM CHLORIDE 600; 310; 30; 20 MG/100ML; MG/100ML; MG/100ML; MG/100ML
125 INJECTION, SOLUTION INTRAVENOUS CONTINUOUS
Status: DISCONTINUED | OUTPATIENT
Start: 2022-07-13 | End: 2022-07-13 | Stop reason: HOSPADM

## 2022-07-13 RX ORDER — METHOTREXATE 25 MG/ML
50 INJECTION INTRA-ARTERIAL; INTRAMUSCULAR; INTRATHECAL; INTRAVENOUS ONCE
Status: COMPLETED | OUTPATIENT
Start: 2022-07-13 | End: 2022-07-13

## 2022-07-13 RX ORDER — ONDANSETRON 2 MG/ML
4 INJECTION INTRAMUSCULAR; INTRAVENOUS EVERY 4 HOURS PRN
Status: DISCONTINUED | OUTPATIENT
Start: 2022-07-13 | End: 2022-07-13 | Stop reason: HOSPADM

## 2022-07-13 RX ORDER — LANOLIN ALCOHOL/MO/W.PET/CERES
400 CREAM (GRAM) TOPICAL DAILY
Qty: 30 TABLET | Refills: 1 | Status: SHIPPED | OUTPATIENT
Start: 2022-07-13 | End: 2022-08-12

## 2022-07-13 RX ADMIN — SODIUM CHLORIDE, SODIUM LACTATE, POTASSIUM CHLORIDE, AND CALCIUM CHLORIDE 125 ML/HR: .6; .31; .03; .02 INJECTION, SOLUTION INTRAVENOUS at 18:08

## 2022-07-13 RX ADMIN — METHOTREXATE 87 MG: 25 INJECTION INTRA-ARTERIAL; INTRAMUSCULAR; INTRATHECAL; INTRAVENOUS at 20:18

## 2022-07-13 NOTE — ASSESSMENT & PLAN NOTE
Extensively counseled patient on ultrasound findings as well as b-hCG trend  Discussed concern for ectopic pregnancy and need for further management  Reviewed that this is an abnormal pregnancy and there was never a normal, healthy pregnancy present within the uterus  Discussed risk of recurrence for ectopic and next management steps surgical vs  Medical  Counseled on risks of surgery including infection, bleeding, damage to surrounding structures and that standard of care is salpingectomy  Given patient highly desires future fertility, she is appropriately worried about salpingectomy and expresses wish to avoid surgery at this time  Counseled on methotrexate management with patient  Patient is a good candidate for medical management of ectopic pregnancy   There is no free fluid on ultrasound   The mass is 1 cm in size   There is no FHR seen   The patient is hemodynamically stable, lives locally, has reliable transportation and ability to follow up, and has no schedule conflicts with the recommended follow up schedule   The LFT's are normal     I counseled the patient extensively about Methotrexate   She has no contraindications to Methotrexate  She is motivated to avoid surgery and prefers medical management   She understands all possible side effects including oral ulcers, abdominal pain, GI upset, and cramping   She understands there are potential teratogenic effects if an intrauterine pregnancy is exposed to Methotrexate, including anatomical birth defects or miscarriage   She understands there is a possibility of medication failure requiring either a second dose of Methotrexate or surgical intervention   She understands that, until complete resolution, there is always a possibility of internal bleeding or ectopic pregnancy rupture requiring surgery or emergency surgery     She is able to comply with close follow up including blood work, and is able to go for HCG levels on day 4 (Sunday, 7/17) and day 7 (Wednesday, 7/20), which should be drawn at the same lab as today's value  These were ordered today and lab slips with correct dates were provided at time of discharge   She has a support system, including her  at home, and knows that if she has a change in status she must return to the ER   She is able to follow up after the day 7 lab is collected   She is available by phone to discuss her condition, and confirms her working phone number as listed in the chart   She will follow up with this author in the office on Tuesday on Day 5 for close follow-up    All questions were answered to her satisfaction     Plan for methotrexate 50mg/m2 IM administration here in the ED  Zofran prn for nausea/vomiting

## 2022-07-13 NOTE — ED PROVIDER NOTES
History  Chief Complaint   Patient presents with    Pregnancy Problem     PT reports having an ultra sound today and the doctor told her come here to find out where the baby, doctor was not able to visualize as it is ectopic"     39 yo F  who presents to ED after having abnormal US  Pt had a formal US done today that showed 1 3 cm solid right adnexal lesion, suspicious for ectopic pregnancy  Pt has had repeat HCG level done today at was 437 and was 401 4 days ago  Pt was instructed to come to ED for further evaluation  Pt denies fever, CP, SOB, abdominal pain, NV, diarrhea, hematuria, dysuria, vaginal bleeding, and any other sxs  History provided by:  Patient and relative   used: Yes        Prior to Admission Medications   Prescriptions Last Dose Informant Patient Reported? Taking? Prenatal Vit-Fe Fumarate-FA (prenatal vitamin) 28-0 8 mg   No No   Sig: Take 1 tablet by mouth daily      Facility-Administered Medications: None       Past Medical History:   Diagnosis Date    Abnormal Pap smear of cervix     HPV (human papilloma virus) infection     Miscarriage     Patient denies medical problems        Past Surgical History:   Procedure Laterality Date     SECTION      x2    COLPOSCOPY         Family History   Problem Relation Age of Onset    Hypertension Mother     Cancer Father         throat    No Known Problems Sister     No Known Problems Brother     No Known Problems Son     No Known Problems Son      I have reviewed and agree with the history as documented      E-Cigarette/Vaping    E-Cigarette Use Never User      E-Cigarette/Vaping Substances    Nicotine No     THC No     CBD No     Flavoring No     Other No     Unknown No      Social History     Tobacco Use    Smoking status: Never Smoker    Smokeless tobacco: Never Used   Vaping Use    Vaping Use: Never used   Substance Use Topics    Alcohol use: Not Currently     Alcohol/week: 1 0 standard drink     Types: 1 Cans of beer per week    Drug use: Never        Review of Systems   Constitutional: Negative for chills and fever  HENT: Negative for ear pain and sore throat  Eyes: Negative for pain and visual disturbance  Respiratory: Negative for cough and shortness of breath  Cardiovascular: Negative for chest pain and palpitations  Gastrointestinal: Negative for abdominal pain, diarrhea, nausea and vomiting  Genitourinary: Negative for dysuria, hematuria, vaginal bleeding and vaginal discharge  +pregnancy, abnormal US   Musculoskeletal: Negative for arthralgias and back pain  Skin: Negative for color change and rash  Neurological: Negative for seizures and syncope  All other systems reviewed and are negative  Physical Exam  ED Triage Vitals [07/13/22 1715]   Temperature Pulse Respirations Blood Pressure SpO2   98 8 °F (37 1 °C) 84 18 118/72 99 %      Temp Source Heart Rate Source Patient Position - Orthostatic VS BP Location FiO2 (%)   Oral Monitor Sitting Left arm --      Pain Score       No Pain             Orthostatic Vital Signs  Vitals:    07/13/22 1715   BP: 118/72   Pulse: 84   Patient Position - Orthostatic VS: Sitting       Physical Exam  Vitals and nursing note reviewed  Constitutional:       General: She is not in acute distress  Appearance: She is well-developed  HENT:      Head: Normocephalic and atraumatic  Mouth/Throat:      Mouth: Mucous membranes are moist       Pharynx: No oropharyngeal exudate  Eyes:      Conjunctiva/sclera: Conjunctivae normal    Cardiovascular:      Rate and Rhythm: Normal rate and regular rhythm  Heart sounds: No murmur heard  Pulmonary:      Effort: Pulmonary effort is normal  No respiratory distress  Breath sounds: Normal breath sounds  Abdominal:      Palpations: Abdomen is soft  Tenderness: There is no abdominal tenderness  Musculoskeletal:      Cervical back: Neck supple     Skin:     General: Skin is warm and dry  Capillary Refill: Capillary refill takes less than 2 seconds  Neurological:      Mental Status: She is alert           ED Medications  Medications   lactated ringers infusion (0 mL/hr Intravenous Stopped 7/13/22 2109)   ondansetron (ZOFRAN) injection 4 mg (has no administration in time range)   methotrexate (PF) injection 87 mg (87 mg Intramuscular Given 7/13/22 2018)       Diagnostic Studies  Results Reviewed     Procedure Component Value Units Date/Time    Comprehensive metabolic panel [[id number]]  (Abnormal) Collected: 07/13/22 1809    Lab Status: Final result Specimen: Blood from Arm, Left Updated: 07/13/22 1843     Sodium 138 mmol/L      Potassium 3 5 mmol/L      Chloride 108 mmol/L      CO2 24 mmol/L      ANION GAP 6 mmol/L      BUN 9 mg/dL      Creatinine 0 53 mg/dL      Glucose 83 mg/dL      Calcium 8 7 mg/dL      AST 11 U/L      ALT 7 U/L      Alkaline Phosphatase 55 U/L      Total Protein 6 6 g/dL      Albumin 3 9 g/dL      Total Bilirubin 0 35 mg/dL      eGFR 122 ml/min/1 73sq m     Narrative:      [place] guidelines for Chronic Kidney Disease (CKD):     Stage 1 with normal or high GFR (GFR > 90 mL/min/1 73 square meters)    Stage 2 Mild CKD (GFR = 60-89 mL/min/1 73 square meters)    Stage 3A Moderate CKD (GFR = 45-59 mL/min/1 73 square meters)    Stage 3B Moderate CKD (GFR = 30-44 mL/min/1 73 square meters)    Stage 4 Severe CKD (GFR = 15-29 mL/min/1 73 square meters)    Stage 5 End Stage CKD (GFR <15 mL/min/1 73 square meters)  Note: GFR calculation is accurate only with a steady state creatinine    CBC and Platelet [[id number]]  (Abnormal) Collected: 07/13/22 1809    Lab Status: Final result Specimen: Blood from Arm, Left Updated: 07/13/22 1818     WBC 11 79 Thousand/uL      RBC 4 36 Million/uL      Hemoglobin 12 9 g/dL      Hematocrit 38 4 %      MCV 88 fL      MCH 29 6 pg      MCHC 33 6 g/dL      RDW 13 2 %      Platelets 416 Thousands/uL MPV 9 5 fL                  No orders to display         Procedures  Procedures      ED Course                             SBIRT 22yo+    Flowsheet Row Most Recent Value   SBIRT (23 yo +)    In order to provide better care to our patients, we are screening all of our patients for alcohol and drug use  Would it be okay to ask you these screening questions? Unable to answer at this time Filed at: 07/13/2022 1814                [facility]  Number of Diagnoses or Management Options  Ectopic pregnancy  Diagnosis management comments: Presents with US that suspicious for ectopic pregnancy  Vitals: stable during ED stay  Pt receiving: LR, Zofran, methotrexate  Labs: CBC had mildly elevated WBC 11 79  CMP WNL  Pt is O+  Results discussed with pt  Consults: Spoke with OB resident on-call, Dr Yanci Hou, recommended ordering CMP, type and screen, and for pt to be NPO  OB will evaluate pt in ED  OB evaluated pt in ED  Gave pt option of surgery vs methotrexate  Pt decided methotrexate option  Will monitor in ED for 15-20 min after receiving methotrexate and then d/c home if not complications  Pt was monitored after receiving methotrexate and zofran, she had no complications  Plan: d/c home with follow-up with OB and strict return precautions, including but not limited severe vaginal bleeding (1 pad per hour), severe abdominal pain  Pt was instructed by OB to take folic acid  Pt is agreeable with plan  Pt was given the opportunity to ask questions  All questions and concerns were addressed during ED visit         Amount and/or Complexity of Data Reviewed  Clinical lab tests: ordered and reviewed  Review and summarize past medical records: yes  Discuss the patient with other providers: yes        Disposition  Final diagnoses:   Ectopic pregnancy     Time reflects when diagnosis was documented in both MDM as applicable and the Disposition within this note     Time User Action Codes Description Comment    7/13/2022  6:28 PM Leslie Lulu Add [O00 90] Ectopic pregnancy       ED Disposition     ED Disposition   Discharge    Condition   Stable    Date/Time   Wed Jul 13, 2022  8:48 PM    Comment   Jame Aviles discharge to home/self care  Follow-up Information     Follow up With Specialties Details Why Contact Info Additional [facility] Schedule an appointment as soon as possible for a visit in 6 day(s) methotrexate follow up [address] [place] [address] [address] [facility] [address]  [facility], [facility] [address], [place], [place], [facility] [address]    [address] Emergency Department Emergency Medicine  As needed, If symptoms worsen [address] [facility] [address] [facility] Emergency Department, [address], [place], [place], [address]          Discharge Medication List as of 7/13/2022  8:49 PM      START taking these medications    Details   folic acid (FOLVITE) 939 mcg tablet Take 1 tablet (400 mcg total) by mouth daily, Starting Wed 7/13/2022, Until Fri 8/12/2022, Normal         CONTINUE these medications which have NOT CHANGED    Details   Prenatal Vit-Fe Fumarate-FA (prenatal vitamin) 28-0 8 mg Take 1 tablet by mouth daily, Starting Mon 10/18/2021, Normal           Outpatient Discharge Orders   hCG, quantitative   Standing Status: Future Standing Exp  Date: 08/13/22     hCG, quantitative   Standing Status: Future Standing Exp  Date: 07/13/23       PDMP Review     None           ED Provider  Attending physically available and evaluated Jame Aviles  I managed the patient along with the ED Attending      Electronically Signed by         Perley Fabry, DO  07/13/22 6439

## 2022-07-13 NOTE — CONSULTS
Consultation - Gynecology   Salma Avendano 40 y o  female MRN: [id number]  Unit/Bed#: ED 32 Encounter: [id number]    Chief Complaint   Patient presents with    Pregnancy Problem     PT reports having an ultra sound today and the doctor told her come here to find out where the baby, doctor was not able to visualize as it is ectopic"     [address] #[id number] and #[id number] used for translation and counseling    History of Present Illness   Physician Requesting Consult: Gaby Gates MD  Reason for Consult / Principal Problem: ectopic pregnancy  HPI: Salma Avendano is a 40y o  year old  female with pregnancy of unknown location who presents for evaluation following ultrasound concerning for ectopic pregnancy  Patient initially presented to ED with positive pregnancy test on   Her b-hCG trend is as follows  - : 273  - 7/2: 258  - : 401  - : 437    Patient was seen in the office during this time and and discussed pregnancy of unknown location  She had outpatient ultrasound completed which read "1 3 cm solid right adnexal lesion, suspicious for ectopic pregnancy"  No active blood flow was noted to the area  She had no free fluid  She presents to the ED for evaluation of surgical vs medical management  The patient highly desires fertility  Today, she reports feeling well  Denies abdominal pain or vaginal bleeding  She has not other complaints  Inpatient consult to Obstetrics / Gynecology  Consult performed by: Abdi Olvera MD  Consult ordered by: Gaby Gates MD        Review of Systems   Constitutional: Negative for chills and fever  HENT: Negative  Eyes: Negative for visual disturbance  Respiratory: Negative for shortness of breath  Cardiovascular: Negative for chest pain  Gastrointestinal: Negative for abdominal pain, constipation, diarrhea, nausea and vomiting     Genitourinary: Negative for dysuria, hematuria, vaginal bleeding, vaginal discharge and vaginal pain  Musculoskeletal: Negative for arthralgias, back pain and myalgias  Skin: Negative  Neurological: Negative for headaches  Psychiatric/Behavioral: Negative  Historical Information   Past Medical History:   Diagnosis Date    Abnormal Pap smear of cervix     HPV (human papilloma virus) infection     Miscarriage     Patient denies medical problems      Past Surgical History:   Procedure Laterality Date     SECTION      x2    COLPOSCOPY       OB History    Para Term  AB Living   5 2 2 0 2 2   SAB IAB Ectopic Multiple Live Births   2 0 0 0 2      # Outcome Date GA Lbr Hitesh/2nd Weight Sex Delivery Anes PTL Lv   5 Current            4 SAB 10/08/21           3 Term      CS-LTranv   SHEA   2 Term      CS-Unspec   SHEA   1 SAB 01             Family History   Problem Relation Age of Onset    Hypertension Mother     Cancer Father         throat    No Known Problems Sister     No Known Problems Brother     No Known Problems Son     No Known Problems Son      Social History   Social History     Substance and Sexual Activity   Alcohol Use Not Currently    Alcohol/week: 1 0 standard drink    Types: 1 Cans of beer per week     Social History     Substance and Sexual Activity   Drug Use Never     Social History     Tobacco Use   Smoking Status Never Smoker   Smokeless Tobacco Never Used       Meds/Allergies   Current Facility-Administered Medications   Medication Dose Route Frequency    lactated ringers infusion  125 mL/hr Intravenous Continuous    methotrexate injection 80 mg  50 mg/m2 (Adjusted) Intramuscular Once    ondansetron (ZOFRAN) injection 4 mg  4 mg Intravenous Q4H PRN         No Known Allergies    Objective   Vitals: Blood pressure 118/72, pulse 84, temperature 98 8 °F (37 1 °C), temperature source Oral, resp  rate 18, weight 70 9 kg (156 lb 6 4 oz), SpO2 99 %, unknown if currently breastfeeding   Body mass index is 27 71 kg/m²  No intake or output data in the 24 hours ending 07/13/22 1930    Invasive Devices  Report    Peripheral Intravenous Line  Duration           Peripheral IV 07/13/22 Left Antecubital <1 day                Physical Exam  Vitals reviewed  Exam conducted with a chaperone present  Constitutional:       General: She is not in acute distress  Appearance: Normal appearance  She is well-developed and normal weight  She is not ill-appearing, toxic-appearing or diaphoretic  HENT:      Head: Normocephalic and atraumatic  Eyes:      General: No scleral icterus  Conjunctiva/sclera: Conjunctivae normal    Cardiovascular:      Rate and Rhythm: Normal rate and regular rhythm  Heart sounds: Normal heart sounds  No murmur heard  No friction rub  No gallop  Pulmonary:      Effort: Pulmonary effort is normal  No respiratory distress  Breath sounds: Normal breath sounds  No stridor  No wheezing, rhonchi or rales  Abdominal:      General: There is no distension  Palpations: Abdomen is soft  There is no mass  Tenderness: There is no abdominal tenderness  There is no guarding or rebound  Genitourinary:     General: Normal vulva  Musculoskeletal:      Right lower leg: No edema  Left lower leg: No edema  Skin:     General: Skin is warm and dry  Neurological:      General: No focal deficit present  Mental Status: She is alert and oriented to person, place, and time     Psychiatric:         Mood and Affect: Mood normal          Behavior: Behavior normal          Lab Results:   Results from last 7 days   Lab Units 07/13/22  1809   WBC Thousand/uL 11 79*   HEMOGLOBIN g/dL 12 9   HEMATOCRIT % 38 4   PLATELETS Thousands/uL 274      Results from last 7 days   Lab Units 07/13/22  1809   POTASSIUM mmol/L 3 5   CHLORIDE mmol/L 108   CO2 mmol/L 24   BUN mg/dL 9   CREATININE mg/dL 0 53*   CALCIUM mg/dL 8 7   ALK PHOS U/L 55   ALT U/L 7   AST U/L 11*                          Micro:  Lab Results   Component Value Date    URINECX 50,000-59,000 cfu/ml Escherichia coli (A) 10/08/2021       Imaging Studies: I have personally reviewed pertinent reports  OBSTETRIC ULTRASOUND, LIMITED     INDICATION: Abnormally rising hCG  The LMP is 5/31/2022      COMPARISON: None      TECHNIQUE:   Transabdominal ultrasound of the pelvis was performed  Additional transvaginal imaging was then performed to better assess, myometrial/endometrial architecture and ovarian parenchymal detail  The study includes volumetric sweeps and   traditional still imaging technique       FINDINGS:     UTERUS:  The uterus is anteverted in position, measuring 10 9 x 4 3 x 6 2 cm  Contour and echotexture appear normal   The cervix is closed and within normal limits      ENDOMETRIUM:    Endometrial stripe measures mm  No evidence of intrauterine gestation      OVARIES/ADNEXA:  There is a 1 1 x 1 1 x 1 3 cm solid right adnexal lesion with the suggestion of a partial ring of fire  There is no free fluid      Right ovary:  3 7 x 2 6 x 2 7 cm  Volume 13 4  Doppler flow present  No suspicious abnormality  Dominant follicle noted      Left ovary:  2 6 x 1 2 x 1 5 cm  Volume  2 5  Doppler flow present  No suspicious abnormality      IMPRESSION:  1 3 cm solid right adnexal lesion, suspicious for ectopic pregnancy  EKG, Pathology, and Other Studies: I have personally reviewed pertinent reports  Assessment/Plan     Assessment:  41 yo [id number] with suspected right tubal ectopic pregnancy, currently stable  Plan:  * Right tubal pregnancy without intrauterine pregnancy  Assessment & Plan  Extensively counseled patient on ultrasound findings as well as b-hCG trend  Discussed concern for ectopic pregnancy and need for further management  Reviewed that this is an abnormal pregnancy and there was never a normal, healthy pregnancy present within the uterus   Discussed risk of recurrence for ectopic and next management steps surgical vs  Medical  Counseled on risks of surgery including infection, bleeding, damage to surrounding structures and that standard of care is salpingectomy  Given patient highly desires future fertility, she is appropriately worried about salpingectomy and expresses wish to avoid surgery at this time  Counseled on methotrexate management with patient  Patient is a good candidate for medical management of ectopic pregnancy   There is no free fluid on ultrasound   The mass is 1 cm in size   There is no FHR seen   The patient is hemodynamically stable, lives locally, has reliable transportation and ability to follow up, and has no schedule conflicts with the recommended follow up schedule   The LFT's are normal     I counseled the patient extensively about Methotrexate   She has no contraindications to Methotrexate  She is motivated to avoid surgery and prefers medical management   She understands all possible side effects including oral ulcers, abdominal pain, GI upset, and cramping   She understands there are potential teratogenic effects if an intrauterine pregnancy is exposed to Methotrexate, including anatomical birth defects or miscarriage   She understands there is a possibility of medication failure requiring either a second dose of Methotrexate or surgical intervention   She understands that, until complete resolution, there is always a possibility of internal bleeding or ectopic pregnancy rupture requiring surgery or emergency surgery   She is able to comply with close follow up including blood work, and is able to go for HCG levels on day 4 (Sunday, 7/17) and day 7 (Wednesday, 7/20), which should be drawn at the same lab as today's value  These were ordered today and lab slips with correct dates were provided at time of discharge   She has a support system, including her  at home, and knows that if she has a change in status she must return to the ER     She is able to follow up after the day 7 lab is collected   She is available by phone to discuss her condition, and confirms her working phone number as listed in the chart   She will follow up with this author in the office on Tuesday on Day 5 for close follow-up    All questions were answered to her satisfaction     Plan for methotrexate 50mg/m2 IM administration here in the ED  Zofran prn for nausea/vomiting    D/w Dr Kodak Bledsoe MD  OB/GYN PGY-3  7:48 PM  07/13/22

## 2022-07-14 NOTE — DISCHARGE INSTRUCTIONS
Please return to ED for any worsening symptoms  Including vaginal bleeding, severe abdominal pain, fever  Please follow-up with OB as soon as possible for a visit in 6 days

## 2022-07-14 NOTE — ED ATTENDING ATTESTATION
7/13/2022  I, Nikki Johnson MD, saw and evaluated the patient  I have discussed the patient with the resident/non-physician practitioner and agree with the resident's/non-physician practitioner's findings, Plan of Care, and MDM as documented in the resident's/non-physician practitioner's note, except where noted  All available labs and Radiology studies were reviewed  I was present for key portions of any procedure(s) performed by the resident/non-physician practitioner and I was immediately available to provide assistance  At this point I agree with the current assessment done in the Emergency Department  I have conducted an independent evaluation of this patient a history and physical is as follows:    Patient is a 41-year-old presenting to ER due to for ectopic pregnancy on outpatient ultrasound  Denies any abdominal pain, lightheadedness, dizziness, bleeding  OB to evaluate patient      ED Course         Critical Care Time  Procedures

## 2022-07-17 ENCOUNTER — APPOINTMENT (OUTPATIENT)
Dept: LAB | Facility: HOSPITAL | Age: 37
End: 2022-07-17

## 2022-07-17 DIAGNOSIS — O00.90 ECTOPIC PREGNANCY: ICD-10-CM

## 2022-07-17 LAB — B-HCG SERPL-ACNC: 396 MIU/ML

## 2022-07-17 PROCEDURE — 36415 COLL VENOUS BLD VENIPUNCTURE: CPT

## 2022-07-17 PROCEDURE — 84702 CHORIONIC GONADOTROPIN TEST: CPT

## 2022-07-19 ENCOUNTER — OFFICE VISIT (OUTPATIENT)
Dept: OBGYN CLINIC | Facility: CLINIC | Age: 37
End: 2022-07-19

## 2022-07-19 VITALS
DIASTOLIC BLOOD PRESSURE: 68 MMHG | BODY MASS INDEX: 27.29 KG/M2 | SYSTOLIC BLOOD PRESSURE: 133 MMHG | HEIGHT: 63 IN | HEART RATE: 79 BPM | WEIGHT: 154 LBS

## 2022-07-19 DIAGNOSIS — O00.101 RIGHT TUBAL PREGNANCY WITHOUT INTRAUTERINE PREGNANCY: Primary | ICD-10-CM

## 2022-07-19 PROCEDURE — 99213 OFFICE O/P EST LOW 20 MIN: CPT | Performed by: OBSTETRICS & GYNECOLOGY

## 2022-07-20 NOTE — ASSESSMENT & PLAN NOTE
Patient s/p MTXon 7/13  b-HCG day of MTX administration: 437  Day 4 Quant: 396  15% drop by day 7 s/p MTX will be 372  Patient reports feeling well since MTX, no complaints  Reviewed strict bleeding and pain precautions  Reviewed potential need for second dose of MTX if inappropriate b-hcg drop  Will call patient tomorrow follow 7/20 day 7 level

## 2022-07-20 NOTE — PROGRESS NOTES
Jarad Dick, 67/1928, 81year old, female at [place] [address] for strengthening and rehab on 3/18/2018 directly from [address] [place] [address] ED after fall at her Independent Apartment at [facility].     Previous admit to [facility] rehab: 2/19/2018-3/15/2018 OB/GYN VISIT  Jame Hairston Blinks  22  9:30 AM     [employer]  #[id number] used for translation    Assessment/Plan:    Problem List Items Addressed This Visit        Genitourinary    Right tubal pregnancy without intrauterine pregnancy - Primary     Patient s/p MTXon   b-HCG day of MTX administration: 437  Day 4 Quant: 396  15% drop by day 7 s/p MTX will be 372  Patient reports feeling well since MTX, no complaints  Reviewed strict bleeding and pain precautions  Reviewed potential need for second dose of MTX if inappropriate b-hcg drop  Will call patient tomorrow follow  day 7 level                 Subjective:     Kei Echeverria is a 40 y o  [id number] female who presents for follow-up after methotrexate administration for right tubal ectopic pregnancy on 22  Patient has no complaints this morning  She has been having regular bowel and bladder function  Denies nausea or vomiting  No pain  She has had some vaginal spotting  Objective:    Vitals: Blood pressure 133/68, pulse 79, height 5' 3" (1 6 m), weight 69 9 kg (154 lb), unknown if currently breastfeeding  Body mass index is 27 28 kg/m²  Past Medical History:   Diagnosis Date    Abnormal Pap smear of cervix     HPV (human papilloma virus) infection     Miscarriage     Patient denies medical problems      Past Surgical History:   Procedure Laterality Date     SECTION      x2    COLPOSCOPY         Physical Exam  Vitals reviewed  Constitutional:       General: She is not in acute distress  Appearance: Normal appearance  She is well-developed  She is not ill-appearing, toxic-appearing or diaphoretic  HENT:      Head: Normocephalic and atraumatic  Eyes:      General: No scleral icterus  Conjunctiva/sclera: Conjunctivae normal    Cardiovascular:      Rate and Rhythm: Normal rate  Pulmonary:      Effort: Pulmonary effort is normal  No respiratory distress     Skin:     General: Skin is warm and She reports her mother was safe in the apartment, rested this am, made her breakfast and had appt with Dr Henrietta Branham this afternoon.  Discussed the need to continue rehab and strengthening for her mother and is agreeable to return to rehab after doctor's appoi dry    Neurological:      General: No focal deficit present  Mental Status: She is alert and oriented to person, place, and time     Psychiatric:         Mood and Affect: Mood normal          Behavior: Behavior normal            Carlos Palm MD  7/19/22  9:30 AM

## 2022-07-21 ENCOUNTER — TELEPHONE (OUTPATIENT)
Dept: LABOR AND DELIVERY | Facility: HOSPITAL | Age: 37
End: 2022-07-21

## 2022-07-21 ENCOUNTER — APPOINTMENT (OUTPATIENT)
Dept: LAB | Facility: HOSPITAL | Age: 37
End: 2022-07-21

## 2022-07-21 DIAGNOSIS — O00.101 RIGHT TUBAL PREGNANCY WITHOUT INTRAUTERINE PREGNANCY: Primary | ICD-10-CM

## 2022-07-21 DIAGNOSIS — O00.90 ECTOPIC PREGNANCY: ICD-10-CM

## 2022-07-21 DIAGNOSIS — Z92.21 PRIOR METHOTREXATE THERAPY: ICD-10-CM

## 2022-07-21 LAB — B-HCG SERPL-ACNC: 327 MIU/ML

## 2022-07-21 PROCEDURE — 84702 CHORIONIC GONADOTROPIN TEST: CPT

## 2022-07-21 PROCEDURE — 36415 COLL VENOUS BLD VENIPUNCTURE: CPT

## 2022-07-21 NOTE — TELEPHONE ENCOUNTER
[facility]  #[id number] used for phone conversation  Reviewed day 7 b-hCG from [facility] on 7/13/22  437 --> 327  This is >15% decrease  Plan to continue weekly b-hCG  Patient has f/u with me scheduled in 3 weeks  All questions answered      Rolf Harrell MD  3:35 PM  07/21/22

## 2022-07-28 ENCOUNTER — PATIENT OUTREACH (OUTPATIENT)
Dept: OBGYN CLINIC | Facility: CLINIC | Age: 37
End: 2022-07-28

## 2022-07-28 ENCOUNTER — APPOINTMENT (OUTPATIENT)
Dept: LAB | Facility: HOSPITAL | Age: 37
End: 2022-07-28

## 2022-07-29 ENCOUNTER — TELEPHONE (OUTPATIENT)
Dept: OBGYN CLINIC | Facility: CLINIC | Age: 37
End: 2022-07-29

## 2022-08-04 ENCOUNTER — APPOINTMENT (OUTPATIENT)
Dept: LAB | Facility: HOSPITAL | Age: 37
End: 2022-08-04

## 2022-08-04 LAB — B-HCG SERPL-ACNC: 111 MIU/ML

## 2022-08-04 PROCEDURE — 84702 CHORIONIC GONADOTROPIN TEST: CPT | Performed by: OBSTETRICS & GYNECOLOGY

## 2022-08-04 PROCEDURE — 36415 COLL VENOUS BLD VENIPUNCTURE: CPT | Performed by: OBSTETRICS & GYNECOLOGY

## 2022-08-05 ENCOUNTER — TELEPHONE (OUTPATIENT)
Dept: OBGYN CLINIC | Facility: CLINIC | Age: 37
End: 2022-08-05

## 2022-08-05 NOTE — TELEPHONE ENCOUNTER
----- Message from Umberto Summers MD sent at 8/4/2022  3:58 PM EDT -----  Please notify patient b-hCG continuing to drop - will continue weekly b-hCG levels

## 2022-08-10 ENCOUNTER — APPOINTMENT (OUTPATIENT)
Dept: LAB | Facility: HOSPITAL | Age: 37
End: 2022-08-10

## 2022-08-19 ENCOUNTER — TELEPHONE (OUTPATIENT)
Dept: OBGYN CLINIC | Facility: CLINIC | Age: 37
End: 2022-08-19

## 2022-08-19 NOTE — TELEPHONE ENCOUNTER
----- Message from Umberto Summers MD sent at 8/18/2022 10:16 AM EDT -----  Regarding: call patient to complete bhcg  Hello,    Can we contact this patient about getting her bhcg today? Thanks!  She has an appointment with me next week   ----- Message -----  From: SYSTEM  Sent: 8/17/2022   1:21 AM EDT  To: Umberto Summers MD

## 2022-08-19 NOTE — TELEPHONE ENCOUNTER
Spoke to patient about getting blood work drawn prior to appointment with Dr Sade Antonio next week   Patient stated she will get blood work drawn this weekend

## 2022-08-26 ENCOUNTER — TELEPHONE (OUTPATIENT)
Dept: LABOR AND DELIVERY | Facility: HOSPITAL | Age: 37
End: 2022-08-26

## 2022-08-26 NOTE — TELEPHONE ENCOUNTER
[employer]  #[id number] used for translation  Spoke with patient regarding missed b-hcg quants x2 as well as office appointment this week  Patient stated she has been feeling well without pain or bleeding, but was tired of having to do lab draws  Discussed importance of completing b-hcg levels to trend to zero  Last value was 88 on 8/10/22  Provided patient with phone number and address of [facility] [employer] [facility]  Advised patient to complete as soon as possible, however, patient said the earliest she will be able to go is Monday  Instructed patient to call office with concerns  Will call patient again once b-hcg level is resulted

## 2022-08-29 ENCOUNTER — APPOINTMENT (OUTPATIENT)
Dept: LAB | Facility: CLINIC | Age: 37
End: 2022-08-29

## 2022-09-06 ENCOUNTER — TELEPHONE (OUTPATIENT)
Dept: OBGYN CLINIC | Facility: CLINIC | Age: 37
End: 2022-09-06

## 2022-09-06 NOTE — TELEPHONE ENCOUNTER
Spoke with patient via phone with [employer]  #[id number]  Discussed most recent negative b-hCG value with patient  She does not need to continue to trend further b-hCG values  She is feeling well otherwise with no complaints  Discussed importance with calling office if patient were to become pregnant again, but she is not planning on trying right now  All questions answered      Jeremy Beltran MD  OB/GYN PGY-3  9:03 AM  09/06/22